# Patient Record
Sex: MALE | Race: WHITE | Employment: FULL TIME | URBAN - METROPOLITAN AREA
[De-identification: names, ages, dates, MRNs, and addresses within clinical notes are randomized per-mention and may not be internally consistent; named-entity substitution may affect disease eponyms.]

---

## 2020-06-02 ENCOUNTER — THERAPY VISIT (OUTPATIENT)
Dept: PHYSICAL THERAPY | Facility: CLINIC | Age: 60
End: 2020-06-02
Payer: COMMERCIAL

## 2020-06-02 DIAGNOSIS — M54.2 NECK PAIN: ICD-10-CM

## 2020-06-02 PROCEDURE — 97112 NEUROMUSCULAR REEDUCATION: CPT | Mod: GP | Performed by: PHYSICAL THERAPIST

## 2020-06-02 PROCEDURE — 97161 PT EVAL LOW COMPLEX 20 MIN: CPT | Mod: GP | Performed by: PHYSICAL THERAPIST

## 2020-06-02 PROCEDURE — 97110 THERAPEUTIC EXERCISES: CPT | Mod: GP | Performed by: PHYSICAL THERAPIST

## 2020-06-02 NOTE — LETTER
[facility] PHYSICAL THERAPY  [address]  [place] [address]  [phone]    2020    Re: Noe Diaz   :   1960  MRN:  [id number]   REFERRING PHYSICIAN:   Sim Humphrey    [facility] PHYSICAL THERAPY    Date of Initial Evaluation:  2020  Visits:  Rxs Used: 1  Reason for Referral:  Neck pain    EVALUATION SUMMARY    [facility] Initial Evaluation  Subjective:  The history is provided by the patient. No  was used.   Patient Health History  Noe Diaz being seen for Neck and bilateral arm pain.     Date of Onset:  with a flare-up 2020.   Problem occurred: unknown flare-up   Pain is reported as 2/10 (up to 7/10 with trying to sleep) on pain scale.  General health as reported by patient is good.  Pertinent medical history includes: high blood pressure.   Red flags:  None as reported by patient.  Medical allergies: none.   Surgeries include:  Orthopedic surgery. Other surgery history details: Lumbar L5 discectomy.    Current medications:  Anti-inflammatory.    Current occupation is Director .     Therapist Generated HPI Evaluation  Problem details: Has had chronic neck pain that started in  which was treated with medication and resolved.  Most recently over the past few months there has been an acute flare-up.  Has trailed muscle relaxants, medications and an injection 3 weeks ago with minimal pain.  There is bilateral arm pain L>R which can go into the left ring/pinky. .         Type of problem:  Cervical spine.    This is a new condition.  Condition occurred with:  Insidious onset.  Where condition occurred: at home.  Patient reports pain:  Lower cervical spine.  Pain is described as aching, sharp and shooting and is constant.  Pain radiates to:  Shoulder right, shoulder left and hand left. Pain is worse during the night.  Since onset symptoms are unchanged.  Associated symptoms:   Loss of strength. Symptoms are exacerbated by looking up or  Re: Noe Diaz   :   1960     down, lifting, sitting and lying down  and relieved by nothing.  Special tests included:  MRI and x-ray.    Restrictions due to condition include:  Working in normal job without restrictions.  Barriers include:  None as reported by patient.    Objective:  System  Shoulder Evaluation:  ROM:  Strength:    Flexion: Left:5/5   Pain:    Right: 5/5     Pain:   Elbow Flexion:  Left:5/5     Pain:    Right:5/5     Pain:  Elbow Extension:  Left:5/5     Pain:    Right:5/5     Pain:  ROM:  Strength:    Extension Wrist: Left: 5/5 Pain:  Right: 5/5 Pain:    Randee Cervical Evaluation  Movement Loss:  Flexion (Flex): nil  Retraction (RET): mod  Extension (EXT): mod  Lateral Flexion Right (LF R): min  Lateral Flexion Left (LF L): min  Rotation Right (ROT R): min  Rotation Left (ROT L): min                     Randee Thoracic Evalution:    Movement Loss:  Flexion (Flex):  Nil  Extension (EXT): mod  Rotation Lt (ROT L): min  Rotation Rt (ROT R): min    Assessment/Plan:    Patient is a 59 year old male with cervical complaints.    Patient has the following significant findings with corresponding treatment plan.                Diagnosis 1:  Neck pain  Pain -  hot/cold therapy, electric stimulation, manual therapy, self management, education and directional preference exercise  Decreased ROM/flexibility - manual therapy and therapeutic exercise  Decreased joint mobility - manual therapy and therapeutic exercise  Impaired muscle performance - neuro re-education  Decreased function - therapeutic activities    Previous and current functional limitations:  (See Goal Flow Sheet for this information)    Short term and Long term goals: (See Goal Flow Sheet for this information)     Communication ability:  Patient appears to be able to clearly communicate and understand verbal and written communication and follow directions  correctly.  Treatment Explanation - The following has been discussed with the patient:   RX  Re: Noe Diaz   :   1960     ordered/plan of care  Anticipated outcomes  Possible risks and side effects  This patient would benefit from PT intervention to resume normal activities.   Rehab potential is good.    Frequency:  1 X week, once daily  Duration:  for 6 weeks  Discharge Plan:  Achieve all LTG.  Independent in home treatment program.  Reach maximal therapeutic benefit.    Thank you for your referral.    INQUIRIES  Therapist: Sophia De Jesus DPT, Cert. MDT   [facility] PHYSICAL THERAPY  [address]  [facility] [address]  Phone: [phone]  Fax: [phone]

## 2020-06-02 NOTE — PROGRESS NOTES
[facility] Initial Evaluation  Subjective:  The history is provided by the patient. No  was used.   Patient Health History  Noe Diaz being seen for Neck and bilateral arm pain.     Date of Onset: 2014 with a flare-up 4/2020.   Problem occurred: unknown flare-up   Pain is reported as 2/10 (up to 7/10 with trying to sleep) on pain scale.  General health as reported by patient is good.  Pertinent medical history includes: high blood pressure.   Red flags:  None as reported by patient.  Medical allergies: none.   Surgeries include:  Orthopedic surgery. Other surgery history details: Lumbar L5 discectomy.    Current medications:  Anti-inflammatory.    Current occupation is Director .                     Therapist Generated HPI Evaluation  Problem details: Has had chronic neck pain that started in 2014 which was treated with medication and resolved.  Most recently over the past few months there has been an acute flare-up.  Has trailed muscle relaxants, medications and an injection 3 weeks ago with minimal pain.  There is bilateral arm pain L>R which can go into the left ring/pinky. .         Type of problem:  Cervical spine.    This is a new condition.  Condition occurred with:  Insidious onset.  Where condition occurred: at home.  Patient reports pain:  Lower cervical spine.  Pain is described as aching, sharp and shooting and is constant.  Pain radiates to:  Shoulder right, shoulder left and hand left. Pain is worse during the night.  Since onset symptoms are unchanged.  Associated symptoms:  Loss of strength. Symptoms are exacerbated by looking up or down, lifting, sitting and lying down  and relieved by nothing.  Special tests included:  MRI and x-ray.    Restrictions due to condition include:  Working in normal job without restrictions.  Barriers include:  None as reported by patient.                        Objective:  System                   Shoulder  Evaluation:  ROM:          Strength:    Flexion: Left:5/5   Pain:    Right: 5/5     Pain:                 Elbow Flexion:  Left:5/5     Pain:    Right:5/5     Pain:  Elbow Extension:  Left:5/5     Pain:    Right:5/5     Pain:             ROM:          Strength:          Extension Wrist: Left: 5/5 Pain:  Right: 5/5 Pain:                                                Randee Cervical Evaluation      Movement Loss:    Flexion (Flex): nil  Retraction (RET): mod  Extension (EXT): mod  Lateral Flexion Right (LF R): min  Lateral Flexion Left (LF L): min  Rotation Right (ROT R): min  Rotation Left (ROT L): min                               Randee Thoracic Evalution:    Movement Loss:  Flexion (Flex):  Nil  Extension (EXT): mod  Rotation Lt (ROT L): min  Rotation Rt (ROT R): min                            ROS    Assessment/Plan:    Patient is a 59 year old male with cervical complaints.    Patient has the following significant findings with corresponding treatment plan.                Diagnosis 1:  Neck pain  Pain -  hot/cold therapy, electric stimulation, manual therapy, self management, education and directional preference exercise  Decreased ROM/flexibility - manual therapy and therapeutic exercise  Decreased joint mobility - manual therapy and therapeutic exercise  Impaired muscle performance - neuro re-education  Decreased function - therapeutic activities        Previous and current functional limitations:  (See Goal Flow Sheet for this information)    Short term and Long term goals: (See Goal Flow Sheet for this information)     Communication ability:  Patient appears to be able to clearly communicate and understand verbal and written communication and follow directions correctly.  Treatment Explanation - The following has been discussed with the patient:   RX ordered/plan of care  Anticipated outcomes  Possible risks and side effects  This patient would benefit from PT intervention to resume normal activities.    Rehab potential is good.    Frequency:  1 X week, once daily  Duration:  for 6 weeks  Discharge Plan:  Achieve all LTG.  Independent in home treatment program.  Reach maximal therapeutic benefit.    Please refer to the daily flowsheet for treatment today, total treatment time and time spent performing 1:1 timed codes.

## 2020-06-12 ENCOUNTER — THERAPY VISIT (OUTPATIENT)
Dept: PHYSICAL THERAPY | Facility: CLINIC | Age: 60
End: 2020-06-12
Payer: COMMERCIAL

## 2020-06-12 DIAGNOSIS — M54.2 NECK PAIN: ICD-10-CM

## 2020-06-12 PROCEDURE — 97110 THERAPEUTIC EXERCISES: CPT | Mod: GP | Performed by: PHYSICAL THERAPIST

## 2020-06-12 PROCEDURE — 97140 MANUAL THERAPY 1/> REGIONS: CPT | Mod: GP | Performed by: PHYSICAL THERAPIST

## 2020-06-17 ENCOUNTER — TRANSFERRED RECORDS (OUTPATIENT)
Dept: HEALTH INFORMATION MANAGEMENT | Facility: CLINIC | Age: 60
End: 2020-06-17

## 2020-06-24 ENCOUNTER — THERAPY VISIT (OUTPATIENT)
Dept: PHYSICAL THERAPY | Facility: CLINIC | Age: 60
End: 2020-06-24
Payer: COMMERCIAL

## 2020-06-24 DIAGNOSIS — M54.2 NECK PAIN: ICD-10-CM

## 2020-06-24 PROCEDURE — 97110 THERAPEUTIC EXERCISES: CPT | Mod: GP | Performed by: PHYSICAL THERAPIST

## 2020-08-25 DIAGNOSIS — Z11.59 ENCOUNTER FOR SCREENING FOR OTHER VIRAL DISEASES: Primary | ICD-10-CM

## 2020-08-28 DIAGNOSIS — Z11.59 ENCOUNTER FOR SCREENING FOR OTHER VIRAL DISEASES: ICD-10-CM

## 2020-08-28 PROCEDURE — U0003 INFECTIOUS AGENT DETECTION BY NUCLEIC ACID (DNA OR RNA); SEVERE ACUTE RESPIRATORY SYNDROME CORONAVIRUS 2 (SARS-COV-2) (CORONAVIRUS DISEASE [COVID-19]), AMPLIFIED PROBE TECHNIQUE, MAKING USE OF HIGH THROUGHPUT TECHNOLOGIES AS DESCRIBED BY CMS-2020-01-R: HCPCS | Performed by: ORTHOPAEDIC SURGERY

## 2020-08-30 LAB
SARS-COV-2 RNA SPEC QL NAA+PROBE: NOT DETECTED
SPECIMEN SOURCE: NORMAL

## 2020-08-31 RX ORDER — ACETAMINOPHEN 500 MG
1500 TABLET ORAL 3 TIMES DAILY PRN
COMMUNITY
End: 2021-12-13

## 2020-08-31 RX ORDER — MULTIPLE VITAMINS W/ MINERALS TAB 9MG-400MCG
1 TAB ORAL EVERY MORNING
COMMUNITY

## 2020-08-31 RX ORDER — IBUPROFEN 200 MG
600 TABLET ORAL 3 TIMES DAILY PRN
COMMUNITY

## 2020-08-31 RX ORDER — LISINOPRIL 10 MG/1
10 TABLET ORAL EVERY MORNING
COMMUNITY

## 2020-08-31 RX ORDER — ASPIRIN 81 MG/1
81 TABLET ORAL EVERY MORNING
COMMUNITY

## 2020-08-31 RX ORDER — OMEGA-3S/DHA/EPA/FISH OIL/D3 300MG-1000
1 CAPSULE ORAL EVERY MORNING
COMMUNITY

## 2020-08-31 RX ORDER — SIMVASTATIN 40 MG
40 TABLET ORAL AT BEDTIME
COMMUNITY

## 2020-08-31 NOTE — PROGRESS NOTES
PTA medications updated by Medication Scribe prior to surgery via phone call with patient      -LAST DOSES ENTERED BY NURSE-    Comments:    Medication history sources: Patient, Surescripts and H&P  Medication history source reliability: Good  Adherence assessment: N/A Not Observed    Significant changes made to the medication list:  None      Additional medication history information:   None        Prior to Admission medications    Medication Sig Last Dose Taking? Auth Provider   acetaminophen (TYLENOL) 500 MG tablet Take 1,500 mg by mouth 3 times daily as needed for mild pain  at PRN Yes Reported, Patient   aspirin 81 MG EC tablet Take 81 mg by mouth every morning   at AM Yes Reported, Patient   ibuprofen (ADVIL/MOTRIN) 200 MG tablet Take 600 mg by mouth 3 times daily as needed for mild pain  at PRN Yes Reported, Patient   lisinopril (ZESTRIL) 10 MG tablet Take 10 mg by mouth every morning   at AM Yes Reported, Patient   multivitamin w/minerals (MULTI-VITAMIN) tablet Take 1 tablet by mouth every morning   at AM Yes Reported, Patient   Omega-3 Fatty Acids (FISH OIL BURP-LESS) 1200 MG CAPS Take 1 capsule by mouth every morning   at AM Yes Reported, Patient   simvastatin (ZOCOR) 40 MG tablet Take 40 mg by mouth At Bedtime  at HS Yes Reported, Patient   vitamin C-electrolytes (EMERGEN-C) 1000mg vitamin C super orange drink mix Take 1 packet by mouth every morning Mix 1 packet in 4-6oz water and drink.  at AM Yes Reported, Patient

## 2020-09-01 ENCOUNTER — APPOINTMENT (OUTPATIENT)
Dept: GENERAL RADIOLOGY | Facility: CLINIC | Age: 60
End: 2020-09-01
Attending: ORTHOPAEDIC SURGERY
Payer: COMMERCIAL

## 2020-09-01 ENCOUNTER — HOSPITAL ENCOUNTER (OUTPATIENT)
Facility: CLINIC | Age: 60
Discharge: HOME | End: 2020-09-02
Attending: ORTHOPAEDIC SURGERY | Admitting: ORTHOPAEDIC SURGERY
Payer: COMMERCIAL

## 2020-09-01 ENCOUNTER — ANESTHESIA EVENT (OUTPATIENT)
Dept: SURGERY | Facility: CLINIC | Age: 60
End: 2020-09-01
Payer: COMMERCIAL

## 2020-09-01 ENCOUNTER — ANESTHESIA (OUTPATIENT)
Dept: SURGERY | Facility: CLINIC | Age: 60
End: 2020-09-01
Payer: COMMERCIAL

## 2020-09-01 DIAGNOSIS — M48.02 STENOSIS, CERVICAL SPINE: Primary | ICD-10-CM

## 2020-09-01 LAB
ANION GAP SERPL CALCULATED.3IONS-SCNC: 5 MMOL/L (ref 3–14)
BUN SERPL-MCNC: 15 MG/DL (ref 7–30)
CALCIUM SERPL-MCNC: 8.6 MG/DL (ref 8.5–10.1)
CHLORIDE SERPL-SCNC: 106 MMOL/L (ref 94–109)
CO2 SERPL-SCNC: 26 MMOL/L (ref 20–32)
CREAT SERPL-MCNC: 0.8 MG/DL (ref 0.66–1.25)
GFR SERPL CREATININE-BSD FRML MDRD: >90 ML/MIN/{1.73_M2}
GLUCOSE SERPL-MCNC: 97 MG/DL (ref 70–99)
HGB BLD-MCNC: 14.8 G/DL (ref 13.3–17.7)
POTASSIUM SERPL-SCNC: 4.3 MMOL/L (ref 3.4–5.3)
SODIUM SERPL-SCNC: 137 MMOL/L (ref 133–144)

## 2020-09-01 PROCEDURE — 25000132 ZZH RX MED GY IP 250 OP 250 PS 637: Performed by: ORTHOPAEDIC SURGERY

## 2020-09-01 PROCEDURE — 85018 HEMOGLOBIN: CPT | Performed by: ANESTHESIOLOGY

## 2020-09-01 PROCEDURE — 25000128 H RX IP 250 OP 636: Performed by: INTERNAL MEDICINE

## 2020-09-01 PROCEDURE — 71000012 ZZH RECOVERY PHASE 1 LEVEL 1 FIRST HR: Performed by: ORTHOPAEDIC SURGERY

## 2020-09-01 PROCEDURE — 80048 BASIC METABOLIC PNL TOTAL CA: CPT | Performed by: ANESTHESIOLOGY

## 2020-09-01 PROCEDURE — 25800029 ZZH RX IP 258 OP 250: Performed by: ORTHOPAEDIC SURGERY

## 2020-09-01 PROCEDURE — 25000125 ZZHC RX 250: Performed by: ORTHOPAEDIC SURGERY

## 2020-09-01 PROCEDURE — 25000125 ZZHC RX 250: Performed by: NURSE ANESTHETIST, CERTIFIED REGISTERED

## 2020-09-01 PROCEDURE — 40000277 XR SURGERY CARM FLUORO LESS THAN 5 MIN W STILLS

## 2020-09-01 PROCEDURE — C1713 ANCHOR/SCREW BN/BN,TIS/BN: HCPCS | Performed by: ORTHOPAEDIC SURGERY

## 2020-09-01 PROCEDURE — 25000128 H RX IP 250 OP 636: Performed by: ORTHOPAEDIC SURGERY

## 2020-09-01 PROCEDURE — 36000071 ZZH SURGERY LEVEL 5 W FLUORO 1ST 30 MIN: Performed by: ORTHOPAEDIC SURGERY

## 2020-09-01 PROCEDURE — 25000128 H RX IP 250 OP 636: Performed by: NURSE ANESTHETIST, CERTIFIED REGISTERED

## 2020-09-01 PROCEDURE — 37000008 ZZH ANESTHESIA TECHNICAL FEE, 1ST 30 MIN: Performed by: ORTHOPAEDIC SURGERY

## 2020-09-01 PROCEDURE — 25000566 ZZH SEVOFLURANE, EA 15 MIN: Performed by: ORTHOPAEDIC SURGERY

## 2020-09-01 PROCEDURE — 37000009 ZZH ANESTHESIA TECHNICAL FEE, EACH ADDTL 15 MIN: Performed by: ORTHOPAEDIC SURGERY

## 2020-09-01 PROCEDURE — 27210794 ZZH OR GENERAL SUPPLY STERILE: Performed by: ORTHOPAEDIC SURGERY

## 2020-09-01 PROCEDURE — 25000128 H RX IP 250 OP 636: Performed by: ANESTHESIOLOGY

## 2020-09-01 PROCEDURE — 36000069 ZZH SURGERY LEVEL 5 EA 15 ADDTL MIN: Performed by: ORTHOPAEDIC SURGERY

## 2020-09-01 PROCEDURE — 36415 COLL VENOUS BLD VENIPUNCTURE: CPT | Performed by: ANESTHESIOLOGY

## 2020-09-01 PROCEDURE — 25800030 ZZH RX IP 258 OP 636: Performed by: NURSE ANESTHETIST, CERTIFIED REGISTERED

## 2020-09-01 PROCEDURE — 40000169 ZZH STATISTIC PRE-PROCEDURE ASSESSMENT I: Performed by: ORTHOPAEDIC SURGERY

## 2020-09-01 PROCEDURE — 71000013 ZZH RECOVERY PHASE 1 LEVEL 1 EA ADDTL HR: Performed by: ORTHOPAEDIC SURGERY

## 2020-09-01 DEVICE — IMPLANTABLE DEVICE: Type: IMPLANTABLE DEVICE | Site: SPINE CERVICAL | Status: FUNCTIONAL

## 2020-09-01 RX ORDER — PROPOFOL 10 MG/ML
INJECTION, EMULSION INTRAVENOUS PRN
Status: DISCONTINUED | OUTPATIENT
Start: 2020-09-01 | End: 2020-09-01

## 2020-09-01 RX ORDER — ONDANSETRON 2 MG/ML
INJECTION INTRAMUSCULAR; INTRAVENOUS PRN
Status: DISCONTINUED | OUTPATIENT
Start: 2020-09-01 | End: 2020-09-01

## 2020-09-01 RX ORDER — FENTANYL CITRATE 50 UG/ML
INJECTION, SOLUTION INTRAMUSCULAR; INTRAVENOUS PRN
Status: DISCONTINUED | OUTPATIENT
Start: 2020-09-01 | End: 2020-09-01

## 2020-09-01 RX ORDER — LIDOCAINE HYDROCHLORIDE 20 MG/ML
INJECTION, SOLUTION INFILTRATION; PERINEURAL PRN
Status: DISCONTINUED | OUTPATIENT
Start: 2020-09-01 | End: 2020-09-01

## 2020-09-01 RX ORDER — ACETAMINOPHEN 325 MG/1
975 TABLET ORAL EVERY 8 HOURS
Status: DISCONTINUED | OUTPATIENT
Start: 2020-09-01 | End: 2020-09-02 | Stop reason: HOSPADM

## 2020-09-01 RX ORDER — SODIUM CHLORIDE 450 MG/100ML
INJECTION, SOLUTION INTRAVENOUS CONTINUOUS
Status: DISCONTINUED | OUTPATIENT
Start: 2020-09-01 | End: 2020-09-02 | Stop reason: HOSPADM

## 2020-09-01 RX ORDER — SIMVASTATIN 40 MG
40 TABLET ORAL AT BEDTIME
Status: DISCONTINUED | OUTPATIENT
Start: 2020-09-01 | End: 2020-09-02 | Stop reason: HOSPADM

## 2020-09-01 RX ORDER — HYDROMORPHONE HYDROCHLORIDE 1 MG/ML
.3-.5 INJECTION, SOLUTION INTRAMUSCULAR; INTRAVENOUS; SUBCUTANEOUS
Status: DISCONTINUED | OUTPATIENT
Start: 2020-09-01 | End: 2020-09-02 | Stop reason: HOSPADM

## 2020-09-01 RX ORDER — OXYCODONE HYDROCHLORIDE 5 MG/1
5-10 TABLET ORAL
Status: DISCONTINUED | OUTPATIENT
Start: 2020-09-01 | End: 2020-09-02 | Stop reason: HOSPADM

## 2020-09-01 RX ORDER — METOCLOPRAMIDE 5 MG/1
10 TABLET ORAL EVERY 6 HOURS PRN
Status: DISCONTINUED | OUTPATIENT
Start: 2020-09-01 | End: 2020-09-02 | Stop reason: HOSPADM

## 2020-09-01 RX ORDER — ONDANSETRON 2 MG/ML
4 INJECTION INTRAMUSCULAR; INTRAVENOUS EVERY 30 MIN PRN
Status: DISCONTINUED | OUTPATIENT
Start: 2020-09-01 | End: 2020-09-01 | Stop reason: HOSPADM

## 2020-09-01 RX ORDER — DEXAMETHASONE SODIUM PHOSPHATE 4 MG/ML
INJECTION, SOLUTION INTRA-ARTICULAR; INTRALESIONAL; INTRAMUSCULAR; INTRAVENOUS; SOFT TISSUE PRN
Status: DISCONTINUED | OUTPATIENT
Start: 2020-09-01 | End: 2020-09-01

## 2020-09-01 RX ORDER — GLYCOPYRROLATE 0.2 MG/ML
INJECTION, SOLUTION INTRAMUSCULAR; INTRAVENOUS PRN
Status: DISCONTINUED | OUTPATIENT
Start: 2020-09-01 | End: 2020-09-01

## 2020-09-01 RX ORDER — ONDANSETRON 2 MG/ML
4 INJECTION INTRAMUSCULAR; INTRAVENOUS EVERY 6 HOURS PRN
Status: DISCONTINUED | OUTPATIENT
Start: 2020-09-01 | End: 2020-09-02 | Stop reason: HOSPADM

## 2020-09-01 RX ORDER — NALOXONE HYDROCHLORIDE 0.4 MG/ML
.1-.4 INJECTION, SOLUTION INTRAMUSCULAR; INTRAVENOUS; SUBCUTANEOUS
Status: DISCONTINUED | OUTPATIENT
Start: 2020-09-01 | End: 2020-09-02 | Stop reason: HOSPADM

## 2020-09-01 RX ORDER — ACETAMINOPHEN 325 MG/1
650 TABLET ORAL EVERY 4 HOURS PRN
Status: DISCONTINUED | OUTPATIENT
Start: 2020-09-04 | End: 2020-09-02 | Stop reason: HOSPADM

## 2020-09-01 RX ORDER — LIDOCAINE 40 MG/G
CREAM TOPICAL
Status: DISCONTINUED | OUTPATIENT
Start: 2020-09-01 | End: 2020-09-02 | Stop reason: HOSPADM

## 2020-09-01 RX ORDER — LISINOPRIL 10 MG/1
10 TABLET ORAL EVERY MORNING
Status: DISCONTINUED | OUTPATIENT
Start: 2020-09-01 | End: 2020-09-02 | Stop reason: HOSPADM

## 2020-09-01 RX ORDER — ASPIRIN 81 MG/1
81 TABLET ORAL EVERY MORNING
Status: DISCONTINUED | OUTPATIENT
Start: 2020-09-02 | End: 2020-09-02 | Stop reason: HOSPADM

## 2020-09-01 RX ORDER — HYDROMORPHONE HYDROCHLORIDE 1 MG/ML
.3-.5 INJECTION, SOLUTION INTRAMUSCULAR; INTRAVENOUS; SUBCUTANEOUS EVERY 5 MIN PRN
Status: DISCONTINUED | OUTPATIENT
Start: 2020-09-01 | End: 2020-09-01 | Stop reason: HOSPADM

## 2020-09-01 RX ORDER — SODIUM CHLORIDE, SODIUM LACTATE, POTASSIUM CHLORIDE, CALCIUM CHLORIDE 600; 310; 30; 20 MG/100ML; MG/100ML; MG/100ML; MG/100ML
INJECTION, SOLUTION INTRAVENOUS CONTINUOUS PRN
Status: DISCONTINUED | OUTPATIENT
Start: 2020-09-01 | End: 2020-09-01

## 2020-09-01 RX ORDER — CEFAZOLIN SODIUM IN 0.9 % NACL 3 G/100 ML
3 INTRAVENOUS SOLUTION, PIGGYBACK (ML) INTRAVENOUS
Status: COMPLETED | OUTPATIENT
Start: 2020-09-01 | End: 2020-09-01

## 2020-09-01 RX ORDER — CEFAZOLIN SODIUM 2 G/100ML
2 INJECTION, SOLUTION INTRAVENOUS
Status: DISCONTINUED | OUTPATIENT
Start: 2020-09-01 | End: 2020-09-01

## 2020-09-01 RX ORDER — SODIUM CHLORIDE, SODIUM LACTATE, POTASSIUM CHLORIDE, CALCIUM CHLORIDE 600; 310; 30; 20 MG/100ML; MG/100ML; MG/100ML; MG/100ML
INJECTION, SOLUTION INTRAVENOUS CONTINUOUS
Status: DISCONTINUED | OUTPATIENT
Start: 2020-09-01 | End: 2020-09-01 | Stop reason: HOSPADM

## 2020-09-01 RX ORDER — CEFAZOLIN SODIUM 1 G/3ML
1 INJECTION, POWDER, FOR SOLUTION INTRAMUSCULAR; INTRAVENOUS SEE ADMIN INSTRUCTIONS
Status: DISCONTINUED | OUTPATIENT
Start: 2020-09-01 | End: 2020-09-01 | Stop reason: HOSPADM

## 2020-09-01 RX ORDER — NALOXONE HYDROCHLORIDE 0.4 MG/ML
.1-.4 INJECTION, SOLUTION INTRAMUSCULAR; INTRAVENOUS; SUBCUTANEOUS
Status: DISCONTINUED | OUTPATIENT
Start: 2020-09-01 | End: 2020-09-01

## 2020-09-01 RX ORDER — ONDANSETRON 4 MG/1
4 TABLET, ORALLY DISINTEGRATING ORAL EVERY 6 HOURS PRN
Status: DISCONTINUED | OUTPATIENT
Start: 2020-09-01 | End: 2020-09-02 | Stop reason: HOSPADM

## 2020-09-01 RX ORDER — EPHEDRINE SULFATE 50 MG/ML
INJECTION, SOLUTION INTRAMUSCULAR; INTRAVENOUS; SUBCUTANEOUS PRN
Status: DISCONTINUED | OUTPATIENT
Start: 2020-09-01 | End: 2020-09-01

## 2020-09-01 RX ORDER — METOCLOPRAMIDE HYDROCHLORIDE 5 MG/ML
10 INJECTION INTRAMUSCULAR; INTRAVENOUS EVERY 6 HOURS PRN
Status: DISCONTINUED | OUTPATIENT
Start: 2020-09-01 | End: 2020-09-02 | Stop reason: HOSPADM

## 2020-09-01 RX ORDER — NEOSTIGMINE METHYLSULFATE 1 MG/ML
VIAL (ML) INJECTION PRN
Status: DISCONTINUED | OUTPATIENT
Start: 2020-09-01 | End: 2020-09-01

## 2020-09-01 RX ORDER — ONDANSETRON 4 MG/1
4 TABLET, ORALLY DISINTEGRATING ORAL EVERY 30 MIN PRN
Status: DISCONTINUED | OUTPATIENT
Start: 2020-09-01 | End: 2020-09-01 | Stop reason: HOSPADM

## 2020-09-01 RX ORDER — CEFAZOLIN SODIUM 2 G/100ML
2 INJECTION, SOLUTION INTRAVENOUS EVERY 8 HOURS
Status: COMPLETED | OUTPATIENT
Start: 2020-09-01 | End: 2020-09-02

## 2020-09-01 RX ORDER — CEFAZOLIN SODIUM IN 0.9 % NACL 3 G/100 ML
3 INTRAVENOUS SOLUTION, PIGGYBACK (ML) INTRAVENOUS
Status: DISCONTINUED | OUTPATIENT
Start: 2020-09-01 | End: 2020-09-01 | Stop reason: HOSPADM

## 2020-09-01 RX ORDER — FENTANYL CITRATE 50 UG/ML
25-50 INJECTION, SOLUTION INTRAMUSCULAR; INTRAVENOUS
Status: DISCONTINUED | OUTPATIENT
Start: 2020-09-01 | End: 2020-09-01 | Stop reason: HOSPADM

## 2020-09-01 RX ADMIN — ACETAMINOPHEN 975 MG: 325 TABLET, FILM COATED ORAL at 14:10

## 2020-09-01 RX ADMIN — ROCURONIUM BROMIDE 20 MG: 10 INJECTION INTRAVENOUS at 10:45

## 2020-09-01 RX ADMIN — PROPOFOL 200 MG: 10 INJECTION, EMULSION INTRAVENOUS at 10:11

## 2020-09-01 RX ADMIN — LIDOCAINE HYDROCHLORIDE 100 MG: 20 INJECTION, SOLUTION INFILTRATION; PERINEURAL at 10:11

## 2020-09-01 RX ADMIN — SODIUM CHLORIDE, POTASSIUM CHLORIDE, SODIUM LACTATE AND CALCIUM CHLORIDE: 600; 310; 30; 20 INJECTION, SOLUTION INTRAVENOUS at 11:08

## 2020-09-01 RX ADMIN — CEFAZOLIN SODIUM 2 G: 2 INJECTION, SOLUTION INTRAVENOUS at 18:56

## 2020-09-01 RX ADMIN — Medication 3 G: at 10:28

## 2020-09-01 RX ADMIN — SODIUM CHLORIDE: 4.5 INJECTION, SOLUTION INTRAVENOUS at 13:50

## 2020-09-01 RX ADMIN — ACETAMINOPHEN 975 MG: 325 TABLET, FILM COATED ORAL at 22:08

## 2020-09-01 RX ADMIN — ONDANSETRON 4 MG: 2 INJECTION INTRAMUSCULAR; INTRAVENOUS at 10:28

## 2020-09-01 RX ADMIN — LISINOPRIL 10 MG: 10 TABLET ORAL at 16:14

## 2020-09-01 RX ADMIN — Medication 30 MG: at 10:25

## 2020-09-01 RX ADMIN — HYDROMORPHONE HYDROCHLORIDE 0.3 MG: 1 INJECTION, SOLUTION INTRAMUSCULAR; INTRAVENOUS; SUBCUTANEOUS at 14:10

## 2020-09-01 RX ADMIN — MIDAZOLAM 2 MG: 1 INJECTION INTRAMUSCULAR; INTRAVENOUS at 10:08

## 2020-09-01 RX ADMIN — SIMVASTATIN 40 MG: 40 TABLET, FILM COATED ORAL at 22:08

## 2020-09-01 RX ADMIN — GLYCOPYRROLATE 0.2 MG: 0.2 INJECTION, SOLUTION INTRAMUSCULAR; INTRAVENOUS at 10:36

## 2020-09-01 RX ADMIN — ROCURONIUM BROMIDE 50 MG: 10 INJECTION INTRAVENOUS at 10:11

## 2020-09-01 RX ADMIN — FENTANYL CITRATE 100 MCG: 50 INJECTION, SOLUTION INTRAMUSCULAR; INTRAVENOUS at 10:11

## 2020-09-01 RX ADMIN — ROCURONIUM BROMIDE 20 MG: 10 INJECTION INTRAVENOUS at 11:11

## 2020-09-01 RX ADMIN — HYDROMORPHONE HYDROCHLORIDE 0.5 MG: 1 INJECTION, SOLUTION INTRAMUSCULAR; INTRAVENOUS; SUBCUTANEOUS at 10:36

## 2020-09-01 RX ADMIN — DEXMEDETOMIDINE HYDROCHLORIDE 0.3 MCG/KG/HR: 100 INJECTION, SOLUTION INTRAVENOUS at 10:15

## 2020-09-01 RX ADMIN — NEOSTIGMINE METHYLSULFATE 5 MG: 1 INJECTION, SOLUTION INTRAVENOUS at 11:36

## 2020-09-01 RX ADMIN — GLYCOPYRROLATE 1 MG: 0.2 INJECTION, SOLUTION INTRAMUSCULAR; INTRAVENOUS at 11:36

## 2020-09-01 RX ADMIN — DEXAMETHASONE SODIUM PHOSPHATE 4 MG: 4 INJECTION, SOLUTION INTRA-ARTICULAR; INTRALESIONAL; INTRAMUSCULAR; INTRAVENOUS; SOFT TISSUE at 10:28

## 2020-09-01 RX ADMIN — SODIUM CHLORIDE, POTASSIUM CHLORIDE, SODIUM LACTATE AND CALCIUM CHLORIDE: 600; 310; 30; 20 INJECTION, SOLUTION INTRAVENOUS at 10:08

## 2020-09-01 RX ADMIN — FENTANYL CITRATE 50 MCG: 0.05 INJECTION, SOLUTION INTRAMUSCULAR; INTRAVENOUS at 12:28

## 2020-09-01 RX ADMIN — FENTANYL CITRATE 50 MCG: 0.05 INJECTION, SOLUTION INTRAMUSCULAR; INTRAVENOUS at 12:14

## 2020-09-01 RX ADMIN — GLYCOPYRROLATE 0.2 MG: 0.2 INJECTION, SOLUTION INTRAMUSCULAR; INTRAVENOUS at 10:23

## 2020-09-01 RX ADMIN — HYDROMORPHONE HYDROCHLORIDE 0.5 MG: 1 INJECTION, SOLUTION INTRAMUSCULAR; INTRAVENOUS; SUBCUTANEOUS at 12:58

## 2020-09-01 RX ADMIN — Medication 5 MG: at 11:09

## 2020-09-01 RX ADMIN — Medication 5 MG: at 11:30

## 2020-09-01 SDOH — HEALTH STABILITY: MENTAL HEALTH: CURRENT SMOKER: 0

## 2020-09-01 ASSESSMENT — MIFFLIN-ST. JEOR: SCORE: 2214.68

## 2020-09-01 ASSESSMENT — ENCOUNTER SYMPTOMS: SEIZURES: 0

## 2020-09-01 ASSESSMENT — LIFESTYLE VARIABLES: TOBACCO_USE: 0

## 2020-09-01 ASSESSMENT — COPD QUESTIONNAIRES: COPD: 0

## 2020-09-01 NOTE — BRIEF OP NOTE
[facility]    Brief Operative Note    Pre-operative diagnosis: Cervical spinal stenosis [M48.02]  Post-operative diagnosis Same as pre-operative diagnosis    Procedure: Procedure(s):  ANTERIOR CERVICAL DECOMPRESSION AND FUSION CERVICAL 6-CERVICAL 7  Surgeon: Surgeon(s) and Role:     * John Hernandez MD - Primary     * Sanaz Logan PA-C - Assisting  Anesthesia: General   Estimated blood loss: Less than 50 ml  Drains: None  Specimens: * No specimens in log *  Findings:   None.  Complications: None.  Implants:   Implant Name Type Inv. Item Serial No.  Lot No. LRB No. Used Action   ZERO-P VA IMPLANT 6MM HEIGHT LORDOTIC STERILE     SYNTHES [id number] N/A 1 Implanted   3.7MM TI CERVICAL SPINE SCR SLF-DRLG/VARIABLE ANGLE 14MM Metallic Hardware/[place] 3.7MM TI CERVICAL SPINE SCR SLF-DRLG/VARIABLE ANGLE 14MM  SYNTHES-STRATEC [id number] N/A 1 Implanted   3.7MM TI CERVICAL SPINE SCR SLF-DRLG/VARIABLE ANGLE 14MM Metallic Hardware/[place] 3.7MM TI CERVICAL SPINE SCR SLF-DRLG/VARIABLE ANGLE 14MM  SYNTHES-STRATEC  N/A 2 Wasted   16MM SD SCREW     41 [id number] N/A 1 Implanted   16MM SD SCREW     [id number] N/A 1 Wasted

## 2020-09-01 NOTE — ANESTHESIA CARE TRANSFER NOTE
Patient: Noe Diaz    Procedure(s):  ANTERIOR CERVICAL DECOMPRESSION AND FUSION CERVICAL 6-CERVICAL 7    Diagnosis: Cervical spinal stenosis [M48.02]  Diagnosis Additional Information: No value filed.    Anesthesia Type:   General     Note:  Airway :Face Mask  Patient transferred to:PACU  Comments: Transferred to PACU, spontaneous respirations, 10L oxygen via facemask.  All monitors and alarms on and functioning, VSS.  Patient awake, comfortable.  Report to PACU RN.Handoff Report: Identifed the Patient, Identified the Reponsible Provider, Reviewed the pertinent medical history, Discussed the surgical course, Reviewed Intra-OP anesthesia mangement and issues during anesthesia, Set expectations for post-procedure period and Allowed opportunity for questions and acknowledgement of understanding      Vitals: (Last set prior to Anesthesia Care Transfer)    CRNA VITALS  9/1/2020 1123 - 9/1/2020 1158      9/1/2020             NIBP:  (!) 184/105    NIBP Mean:  129                Electronically Signed By: PIETER Rubin CRNA  September 1, 2020  11:58 AM

## 2020-09-01 NOTE — PROGRESS NOTES
Capno constantly alarming. Probe changed multiple times without any change. Placed on pulse oximeter.

## 2020-09-01 NOTE — OR NURSING
Patient bladder scanned for 175cc. Denies nausea, tolerating ice chips well. Pain tolerable at a 4 on 1-10 scale. LOPEZ strong and equal, mumbness/tingling on left arm. Report given to RN on station 77. Wife updated. trnsferred with one bag of personal belongings and also a brown suitcase

## 2020-09-01 NOTE — ANESTHESIA PREPROCEDURE EVALUATION
Anesthesia Pre-Procedure Evaluation    Patient: Noe Diaz   MRN: [id number] : 1960          Preoperative Diagnosis: Cervical spinal stenosis [M48.02]    Procedure(s):  ANTERIOR CERVICAL DECOMPRESSION AND FUSION CERVICAL 6-CERVICAL 7    Past Medical History:   Diagnosis Date     Abnormal glucose      Acute sinusitis      Benign essential hypertension      Cervical radiculitis      Chest pain, atypical      Decreased sex drive      Dysuria      Elevated liver enzymes      Esophageal reflux      Generalized anxiety disorder      Hematoma, subungual, thumb, left      Insomnia      Left-sided chest pain      Low testosterone in male      Obesity      Pure hypercholesterolemia      Rhinitis, chronic      Splenomegaly      Thumb injury, left, initial encounter      Past Surgical History:   Procedure Laterality Date     HERNIA REPAIR       ORTHOPEDIC SURGERY      lumber discectomy       Anesthesia Evaluation     .             ROS/MED HX    ENT/Pulmonary:     (+)BUBBA risk factors hypertension, , . .   (-) tobacco use, asthma, COPD and sleep apnea   Neurologic: Comment: Cervical radiculopathy     (-) seizures and CVA   Cardiovascular:     (+) Dyslipidemia, hypertension----. : . . . :. .       METS/Exercise Tolerance:  >4 METS   Hematologic:         Musculoskeletal:         GI/Hepatic:     (+) GERD Asymptomatic on medication,       Renal/Genitourinary:         Endo:     (+) Obesity, .   (-) Type I DM and Type II DM   Psychiatric:     (+) psychiatric history anxiety      Infectious Disease:         Malignancy:         Other:                          Physical Exam  Normal systems: dental    Airway   Mallampati: III  TM distance: >3 FB  Neck ROM: limited    Dental     Cardiovascular   Rhythm and rate: regular      Pulmonary    breath sounds clear to auscultation            Lab Results   Component Value Date    HGB 14.3 10/19/2005       Preop Vitals  BP Readings from Last 3 Encounters:   20 (!) 162/85    Pulse  "Readings from Last 3 Encounters:   No data found for Pulse      Resp Readings from Last 3 Encounters:   09/01/20 20    SpO2 Readings from Last 3 Encounters:   09/01/20 99%      Temp Readings from Last 1 Encounters:   09/01/20 36.6  C (97.8  F) (Temporal)    Ht Readings from Last 1 Encounters:   09/01/20 1.956 m (6' 5\")      Wt Readings from Last 1 Encounters:   09/01/20 129.2 kg (284 lb 14.4 oz)    Estimated body mass index is 33.78 kg/m  as calculated from the following:    Height as of this encounter: 1.956 m (6' 5\").    Weight as of this encounter: 129.2 kg (284 lb 14.4 oz).       Anesthesia Plan      History & Physical Review  History and physical reviewed and following examination; no interval change.    ASA Status:  2 .    NPO Status:  > 8 hours    Plan for General with Intravenous induction. Maintenance will be Balanced.    PONV prophylaxis:  Ondansetron (or other 5HT-3) and Dexamethasone or Solumedrol  Additional equipment: Videolaryngoscope Precedex gtt  Ketamine 30 mg prior to incision.    The patient is not a current smoker      Postoperative Care  Postoperative pain management:  Multi-modal analgesia.      Consents  Anesthetic plan, risks, benefits and alternatives discussed with:  Patient.  Use of blood products discussed: Yes.   Use of blood products discussed with Patient.  Consented to blood products.  .                 Frantz Muñiz MD  "

## 2020-09-01 NOTE — OP NOTE
Procedure Date: 09/01/2020      PREOPERATIVE DIAGNOSIS:  Cervical stenosis with cervical radiculopathy, C6-C7.      POSTOPERATIVE DIAGNOSIS:  Cervical stenosis with cervical radiculopathy, C6-C7.      PROCEDURES:   1.  Anterior cervical decompression, C6-C7.   2.  Anterior cervical fusion with Zero-P interbody device and plating with local autograft.      SURGEON:  John Hernandez MD      ASSISTANT:  VERNA Wei      DESCRIPTION OF SURGERY:  The patient was placed under general anesthesia.  After assurance of adequate anesthetic levels, positioned supine on the operating room table.  His anterior neck was prepped and draped in the usual fashion.  He did receive 3 grams of cefazolin, and this was circulating at the time of incision.  An anterior lateral incision was made on the left side and carried down through the platysma.  The interval between the sternocleidomastoid and carotid sheath laterally with the esophagus and trachea medially was developed and carried down to the prevertebral fascia.  This was incised.  The C6-C7 disc space was identified and a needle placed within the disc space and image intensification utilized to confirm anatomic placement.  At this time, the microscope was brought onto the field.  The longus colli were elevated and self-retaining retractors placed.  Ruskin distraction pins were now placed in the C6 and C7.  The anterior annulus was excised.  This was found to be partially calcified.  The disc itself was found to be markedly collapsed.  The disc material was quite degenerated.  The disc material was removed with curets.  Distraction was placed across the disc space, and the dorsal osteophytes were thinned with a Midas Israel with this bone being retained.  Decompression was now carried out through the posterior longitudinal ligament, resulting in excellent decompression of the thecal sac, as well as the neural foramen.  The interspace was found to accept a 6 mm implant.  The  medullary cavity of the PEEK cage was packed with the reamings from the endplates of C6 and C7.  This was placed in position and then the distraction removed.  A 14 mm screw was placed into C7 with a 16 mm screw into C6 with good purchase.  The wound was irrigated copiously.  Final radiographs demonstrated appropriate placement of the hardware.  The platysma was now closed with 3-0 Vicryl and the skin with a running subcuticular 4-0 undyed Vicryl.  Steri-Strips followed by a sterile compressive dressing and a soft cervical collar were applied.  He was then extubated, awakened, and taken to the recovery room in stable condition.        The blood loss was approximately 25 mL.  The specimen was the C6-C7 disc.  There were no intraoperative complications.         SOBIA GARRETT MD             D: 2020   T: 2020   MT: SYDNIE      Name:     DALIA NUNEZ   MRN:      [id number]        Account:        [id number]   :      1960           Procedure Date: 2020      Document: [id number]

## 2020-09-01 NOTE — PLAN OF CARE
A&Ox4. VS htn in 150s-160s SBP. HR chris in the 50bpm on 1L O2. Due to void. Due to dangle. Soft neck brace intact, incision CDI. Neuros intact. L arm numbness/tingling per baseline. Pain with dilaudid and tylenol. Tolerating ice chips/water.

## 2020-09-01 NOTE — ANESTHESIA POSTPROCEDURE EVALUATION
Patient: Noe Diaz    Procedure(s):  ANTERIOR CERVICAL DECOMPRESSION AND FUSION CERVICAL 6-CERVICAL 7    Diagnosis:Cervical spinal stenosis [M48.02]  Diagnosis Additional Information: No value filed.    Anesthesia Type:  General    Note:  Anesthesia Post Evaluation    Patient location during evaluation: PACU  Patient participation: Able to fully participate in evaluation  Level of consciousness: awake and alert  Pain management: adequate  Airway patency: patent  Cardiovascular status: acceptable and stable  Respiratory status: acceptable, spontaneous ventilation, unassisted and nonlabored ventilation  Hydration status: acceptable  PONV: none             Last vitals:  Vitals:    09/01/20 1315 09/01/20 1342 09/01/20 1400   BP: (!) 150/80 (!) 169/89 (!) 167/84   Pulse: 53 56    Resp: 14 15    Temp: 36.7  C (98.1  F)     SpO2: 97% 92% 95%         Electronically Signed By: Frantz Muñiz MD  September 1, 2020  2:06 PM

## 2020-09-02 VITALS
BODY MASS INDEX: 33.64 KG/M2 | RESPIRATION RATE: 16 BRPM | DIASTOLIC BLOOD PRESSURE: 76 MMHG | HEIGHT: 77 IN | SYSTOLIC BLOOD PRESSURE: 141 MMHG | HEART RATE: 58 BPM | WEIGHT: 284.9 LBS | TEMPERATURE: 97.7 F | OXYGEN SATURATION: 96 %

## 2020-09-02 PROCEDURE — 25000132 ZZH RX MED GY IP 250 OP 250 PS 637: Performed by: ORTHOPAEDIC SURGERY

## 2020-09-02 PROCEDURE — 25000128 H RX IP 250 OP 636: Performed by: ORTHOPAEDIC SURGERY

## 2020-09-02 RX ORDER — CALCIUM CARBONATE 500 MG/1
1000 TABLET, CHEWABLE ORAL EVERY 4 HOURS PRN
Status: DISCONTINUED | OUTPATIENT
Start: 2020-09-02 | End: 2020-09-02 | Stop reason: HOSPADM

## 2020-09-02 RX ORDER — TRAMADOL HYDROCHLORIDE 50 MG/1
50 TABLET ORAL EVERY 6 HOURS PRN
Status: DISCONTINUED | OUTPATIENT
Start: 2020-09-02 | End: 2020-09-02 | Stop reason: HOSPADM

## 2020-09-02 RX ORDER — TRAMADOL HYDROCHLORIDE 50 MG/1
50 TABLET ORAL EVERY 6 HOURS PRN
Qty: 20 TABLET | Refills: 0 | Status: SHIPPED | OUTPATIENT
Start: 2020-09-02 | End: 2021-12-13

## 2020-09-02 RX ADMIN — CALCIUM CARBONATE (ANTACID) CHEW TAB 500 MG 1000 MG: 500 CHEW TAB at 11:31

## 2020-09-02 RX ADMIN — ACETAMINOPHEN 975 MG: 325 TABLET, FILM COATED ORAL at 11:32

## 2020-09-02 RX ADMIN — CALCIUM CARBONATE (ANTACID) CHEW TAB 500 MG 1000 MG: 500 CHEW TAB at 03:35

## 2020-09-02 RX ADMIN — CEFAZOLIN SODIUM 2 G: 2 INJECTION, SOLUTION INTRAVENOUS at 03:35

## 2020-09-02 RX ADMIN — CALCIUM CARBONATE (ANTACID) CHEW TAB 500 MG 1000 MG: 500 CHEW TAB at 15:10

## 2020-09-02 RX ADMIN — LISINOPRIL 10 MG: 10 TABLET ORAL at 08:57

## 2020-09-02 RX ADMIN — ACETAMINOPHEN 975 MG: 325 TABLET, FILM COATED ORAL at 05:34

## 2020-09-02 RX ADMIN — ASPIRIN 81 MG: 81 TABLET, COATED ORAL at 08:57

## 2020-09-02 NOTE — PLAN OF CARE
Anterior cervical fusion/decompression POD1. A&O. CMS/neuros - alert & oriented, following all commands, no vision deficits, no dizziness, no shortness of breath/discomfort in bilateral shoulders (left greater than right). Tolerating soft foods/regular diet. VSS. Dressing - anterior changed prior to discharge/sterry strips intact/no drainage. Up independently in room & hallway. Continent of urine/no bm since surgery/flatus positive. C/o neck & incisional discomfort - tylenol effective. C/o of some indigestion intermittently - tums provided & helpful. Pt scoring green on the Aggression Stop Light Tool. Discharge planned for this afternoon, awaiting ride from spouse to home.  AVS reviewed, prescription for tramadol provided/questions answered.  Patient agrees with discharge plan, no unmet needs at this time.

## 2020-09-02 NOTE — DISCHARGE INSTRUCTIONS
TAKE HOME INSTRUCTIONS FOLLOWING AN ANTERIOR   CERVICAL FUSION    John Hernandez M.D.   [facility]  [phone]    The following instructions are general guidelines which apply if you have had an anterior cervical (neck) fusion. If you have further questions after reviewing these instructions, please feel free to contact Kim Johnson R.N. at [phone].    ACTIVITY:    Walking plays a very important role in your recovery. We encourage you to begin taking short walks as soon as you are discharged from the hospital, increasing your distance and time each day.  Two to three weeks after surgery you may want to vary walking with other forms of aerobic activity, such as riding a stationary bike, using a Nordic Track (without the arm motion) or Stairmaster machine. One of these forms of aerobic activity may be more comfortable than another. You have to experiment and see which one works best for you. What is most important is that you begin SOME form of aerobic exercise.  Your goal for 6 weeks after surgery should be a minimum of 30 minutes, 3 times a week. There are many benefits to aerobic exercise including, muscle strengthening, weight loss, mood elevation and improved sleep.    During the first 2 months after surgery we DO NOT want you to begin any stretching or strengthening exercises for your neck. You may have already been through a course of physical therapy before surgery where you were taught exercises for your neck. You may gradually resume these 2 months post-operatively. If you would like a refresher course or did not participate in PT before surgery and would like to at this time, please ask for a prescription from Dr. Hernandez.     SOFT COLLAR:    You may remove your soft collar during meal times, for showering and at night. Most likely you will be able to discontinue wearing the soft collar after your first post-operative appointment.    INCISION/DRESSING:    You have dissolvable  sutures beneath the skin, which do not need to be removed. The tape-like strips across your incision are called steri-strips. They will eventually fall off on their own and if they have not done so by the time you come in for your first post- operative appointment, we will remove them for you. For your own comfort you may wish to keep a dressing over the incision for a few days. Please inspect your incision once a day and notify our office if there is persistent drainage, swelling and/or redness.       PAIN/PAIN MEDICATIONS:    Pain medication will not take away 100% of your pain. It is meant to keep you reasonably comfortable. If you were taking a lot of pain medications before surgery the medications may not be as effective after surgery. It is normal to experience some pain as you increase your activity. Your pain medication will be refilled up to 4-6 weeks after surgery. Over the course of that period you should gradually decrease the amount of pain medications you take each day. As you decrease your pain medication you should continue taking it at night so that you can still get a good night s sleep. After the pain medication is discontinued, Tylenol should be enough to keep you comfortable.     DO NOT take any form of anti-inflammatory medications such as Advil, Motrin, ibuprofen, Aleve, aspirin, or Celebrex for 3 months after surgery. Studies have shown that taking anti-inflammatory medications after a fusion slows down the healing process.     BOWELS/CONSTIPATION:    Narcotic pain medication can be very constipating. Make sure you drink plenty of fluids and eat plenty of fresh fruits and vegetables to decrease constipation. Walking will also promote normal bowel function. If the constipation continues, you may want to purchase Metamucil or an over the counter suppository at your pharmacy.     WORK:    Please consult with Dr. Hernandez as to when you may return to work. This will vary depending on the type of  work you perform.    GENERAL DO S AND DON TS:      You may shower once you are discharged from the hospital. You do not need to cover your incision. Remove your soft collar and dressing prior to showering. Avoid bending your neck forwards and backwards when you are in the shower.      You may soak in a tub or Jacuzzi after your first post-operative appointment if your incision is healed and not draining.      Avoid bending your neck forwards and backwards repetitively and lifting more than 10 pounds for 2 months after surgery.      We suggest that you not drive for two to three weeks after surgery. You may drive once you have stopped your narcotic pain medications and as comfort allows.      You may resume sexual activity as comfort allows.       If you develop a fever greater than 100.6 (F) which lasts more than 2 days, please contact our office.       After you are discharged from the hospital, please contact our office to schedule your first follow-up appointment for 10-14 days after surgery. At that first appointment, we will take x-rays of your neck and answer any questions you may have.       Remember, this surgery does not give you a normal neck. You may experience periodic flare-ups of neck pain.

## 2020-09-02 NOTE — PLAN OF CARE
Pt here with cervical fusion and decompression. Neuros intact. Baseline N/T in hands and feet. Up SBA with belt to BR. Unable to void, straight cath at 1900 for 800 ml. Tolerating regular diet. Dressing CDI. Cervical collar on. IVF infusing. Slight HTN but improved with PTA med.

## 2020-09-02 NOTE — PLAN OF CARE
AxO x4. VSS, RA. Pain managed with scheduled Tylenol. CMS intact. Ex baseline n/t in hands, unchanged. Neuro's intact. Neck brace in place. Regular diet. SBA. Void x2 this shift. Discharge pending progress.

## 2020-09-02 NOTE — DISCHARGE SUMMARY
Admit Date:     2020   Discharge Date:           DISCHARGE DIAGNOSIS:  Cervical radiculopathy with cervical stenosis at C6 to C7.      PROCEDURE:  Anterior cervical decompression, C6 to C7, with anterior cervical fusion utilizing Zero-P interbody device and plating with local autograft.      HISTORY OF PRESENT ILLNESS AND HOSPITAL COURSE:  Dalia Diaz is 59-year-old gentleman who has developed significant neck and upper extremity radicular pain.  Studies have demonstrated stenosis at the C6 to C7 level.  After failing nonoperative management, it was elected he undergo the aforementioned procedure.  Intraoperatively, this proceeded well.  Postoperatively, he had improvement in his radicular pain.  He was up independently at the time of discharge, wound healing nicely and neurologically intact.      DISCHARGE DISPOSITION:  Discharged home where he will be encouraged to continue with his walking program.  He will be discharged with tramadol to receive 20 of those.  He will return for followup in approximately 2 weeks' time.         SOBIA GARRETT MD             D: 2020   T: 2020   MT:       Name:     DALIA DIAZ   MRN:      [id number]        Account:        [id number]   :      1960           Admit Date:     2020                                  Discharge Date:       Document: [id number]       cc: Sim Humphrey MD

## 2020-09-02 NOTE — PLAN OF CARE
Nursing shift note  POD 1 from a C6-C7 ASF and decompression w/ Dr. Hernandez. A&Ox4. CMS intact. Bowel sounds +, - flatus, tolerating regular diet. VSS on R/A. Dressing CDI. Up independently. Voiding adequately. C/o neck pain, decreased with scheduled tylenol. Pt scoring green on the Aggression Stop Light Tool. Plan to go home at discharge.       Pt's belongings:   (Belongings from admission day present in pt's room)                 Home medications: No

## 2020-10-07 PROBLEM — M54.2 NECK PAIN: Status: RESOLVED | Noted: 2020-06-02 | Resolved: 2020-10-07

## 2020-10-07 NOTE — PROGRESS NOTES
Discharge Note    Progress reporting period is from initial evaluation date (please see noted date below) to Jun 24, 2020.  Linked Episodes   Type: Episode: Status: Noted: Resolved: Last update: Updated by:   PHYSICAL THERAPY Neck with bilateral arm pain-6/2/2020 Active 6/2/2020 6/24/2020 11:57 AM Sophia De Jesus PT      Comments:       Noe failed to follow up and current status is unknown.  Please see information below for last relevant information on current status.  Patient seen for 3 visits.    SUBJECTIVE  Subjective changes noted by patient:  Patient reports he followed up with ortho and reports he recommended surgical options.  Continues to complete exercises multiple times/day.   .  Current pain level is 2/10(up to 7/10 PL).     Previous pain level was  7/10.   Changes in function:  Yes (See Goal flowsheet attached for changes in current functional level)  Adverse reaction to treatment or activity: None    OBJECTIVE  Changes noted in objective findings: CROM Flexion nil loss Retraction min-moderate loss  Exten moderate loss Rotation (L) min-mod  loss (R) min loss     ASSESSMENT/PLAN  Diagnosis: Neck pain   Updated problem list and treatment plan:     STG/LTGs have been met or progress has been made towards goals:  Yes, please see goal flowsheet for most current information  Assessment of Progress: current status is unknown.    Last current status:     Self Management Plans:  HEP  I have re-evaluated this patient and find that the nature, scope, duration and intensity of the therapy is appropriate for the medical condition of the patient.  Noe continues to require the following intervention to meet STG and LTG's:  HEP.    Recommendations:  Discharge with current home program.  Patient to follow up with MD as needed.    Please refer to the daily flowsheet for treatment today, total treatment time and time spent performing 1:1 timed codes.

## 2021-12-03 DIAGNOSIS — Z11.59 ENCOUNTER FOR SCREENING FOR OTHER VIRAL DISEASES: ICD-10-CM

## 2021-12-07 ENCOUNTER — TRANSFERRED RECORDS (OUTPATIENT)
Dept: MULTI SPECIALTY CLINIC | Facility: CLINIC | Age: 61
End: 2021-12-07
Payer: COMMERCIAL

## 2021-12-07 LAB
CREATININE (EXTERNAL): 0.77 MG/DL (ref 0.76–1.27)
GFR ESTIMATED (EXTERNAL): 97 ML/MIN/1.73M2
GFR ESTIMATED (IF AFRICAN AMERICAN) (EXTERNAL): 112 ML/MIN/1.73M2
GLUCOSE (EXTERNAL): 80 MG/DL (ref 65–99)
POTASSIUM (EXTERNAL): 5 MMOL/L (ref 3.5–5.2)

## 2021-12-11 ENCOUNTER — LAB (OUTPATIENT)
Dept: URGENT CARE | Facility: URGENT CARE | Age: 61
End: 2021-12-11
Attending: ORTHOPAEDIC SURGERY
Payer: COMMERCIAL

## 2021-12-11 DIAGNOSIS — Z11.59 ENCOUNTER FOR SCREENING FOR OTHER VIRAL DISEASES: ICD-10-CM

## 2021-12-11 PROCEDURE — U0003 INFECTIOUS AGENT DETECTION BY NUCLEIC ACID (DNA OR RNA); SEVERE ACUTE RESPIRATORY SYNDROME CORONAVIRUS 2 (SARS-COV-2) (CORONAVIRUS DISEASE [COVID-19]), AMPLIFIED PROBE TECHNIQUE, MAKING USE OF HIGH THROUGHPUT TECHNOLOGIES AS DESCRIBED BY CMS-2020-01-R: HCPCS

## 2021-12-11 PROCEDURE — U0005 INFEC AGEN DETEC AMPLI PROBE: HCPCS

## 2021-12-12 LAB — SARS-COV-2 RNA RESP QL NAA+PROBE: NEGATIVE

## 2021-12-13 ENCOUNTER — ANESTHESIA EVENT (OUTPATIENT)
Dept: SURGERY | Facility: CLINIC | Age: 61
End: 2021-12-13
Payer: COMMERCIAL

## 2021-12-14 ENCOUNTER — ANESTHESIA (OUTPATIENT)
Dept: SURGERY | Facility: CLINIC | Age: 61
End: 2021-12-14
Payer: COMMERCIAL

## 2021-12-14 ENCOUNTER — HOSPITAL ENCOUNTER (OUTPATIENT)
Facility: CLINIC | Age: 61
Discharge: HOME | End: 2021-12-14
Attending: ORTHOPAEDIC SURGERY | Admitting: ORTHOPAEDIC SURGERY
Payer: COMMERCIAL

## 2021-12-14 VITALS
WEIGHT: 288.5 LBS | HEIGHT: 76 IN | HEART RATE: 83 BPM | RESPIRATION RATE: 12 BRPM | BODY MASS INDEX: 35.13 KG/M2 | OXYGEN SATURATION: 94 % | DIASTOLIC BLOOD PRESSURE: 69 MMHG | TEMPERATURE: 97.6 F | SYSTOLIC BLOOD PRESSURE: 119 MMHG

## 2021-12-14 DIAGNOSIS — Z98.890 POST-OPERATIVE STATE: Primary | ICD-10-CM

## 2021-12-14 PROCEDURE — 250N000011 HC RX IP 250 OP 636: Performed by: ANESTHESIOLOGY

## 2021-12-14 PROCEDURE — 250N000013 HC RX MED GY IP 250 OP 250 PS 637: Performed by: PHYSICIAN ASSISTANT

## 2021-12-14 PROCEDURE — C1713 ANCHOR/SCREW BN/BN,TIS/BN: HCPCS | Performed by: ORTHOPAEDIC SURGERY

## 2021-12-14 PROCEDURE — 250N000011 HC RX IP 250 OP 636: Performed by: PHYSICIAN ASSISTANT

## 2021-12-14 PROCEDURE — 360N000077 HC SURGERY LEVEL 4, PER MIN: Performed by: ORTHOPAEDIC SURGERY

## 2021-12-14 PROCEDURE — C9290 INJ, BUPIVACAINE LIPOSOME: HCPCS | Performed by: ANESTHESIOLOGY

## 2021-12-14 PROCEDURE — 370N000017 HC ANESTHESIA TECHNICAL FEE, PER MIN: Performed by: ORTHOPAEDIC SURGERY

## 2021-12-14 PROCEDURE — 250N000011 HC RX IP 250 OP 636: Performed by: NURSE ANESTHETIST, CERTIFIED REGISTERED

## 2021-12-14 PROCEDURE — 710N000009 HC RECOVERY PHASE 1, LEVEL 1, PER MIN: Performed by: ORTHOPAEDIC SURGERY

## 2021-12-14 PROCEDURE — 250N000009 HC RX 250: Performed by: NURSE ANESTHETIST, CERTIFIED REGISTERED

## 2021-12-14 PROCEDURE — 272N000001 HC OR GENERAL SUPPLY STERILE: Performed by: ORTHOPAEDIC SURGERY

## 2021-12-14 PROCEDURE — 250N000009 HC RX 250: Performed by: ORTHOPAEDIC SURGERY

## 2021-12-14 PROCEDURE — 710N000012 HC RECOVERY PHASE 2, PER MINUTE: Performed by: ORTHOPAEDIC SURGERY

## 2021-12-14 PROCEDURE — C1762 CONN TISS, HUMAN(INC FASCIA): HCPCS | Performed by: ORTHOPAEDIC SURGERY

## 2021-12-14 PROCEDURE — 258N000003 HC RX IP 258 OP 636: Performed by: ANESTHESIOLOGY

## 2021-12-14 PROCEDURE — 250N000011 HC RX IP 250 OP 636: Performed by: ORTHOPAEDIC SURGERY

## 2021-12-14 PROCEDURE — 258N000003 HC RX IP 258 OP 636: Performed by: NURSE ANESTHETIST, CERTIFIED REGISTERED

## 2021-12-14 PROCEDURE — 999N000141 HC STATISTIC PRE-PROCEDURE NURSING ASSESSMENT: Performed by: ORTHOPAEDIC SURGERY

## 2021-12-14 PROCEDURE — 250N000009 HC RX 250: Performed by: ANESTHESIOLOGY

## 2021-12-14 DEVICE — IMPLANTABLE DEVICE: Type: IMPLANTABLE DEVICE | Site: SHOULDER | Status: FUNCTIONAL

## 2021-12-14 DEVICE — SPEEDBRG IMP SYS W/BIO-COMP SWVLK
Type: IMPLANTABLE DEVICE | Site: SHOULDER | Status: FUNCTIONAL
Brand: ARTHREX®

## 2021-12-14 DEVICE — GRAFT DECELLULARIZED DERMIS ARTHROFLEX 40X70X 2-3MM AFLEX301: Type: IMPLANTABLE DEVICE | Site: SHOULDER | Status: FUNCTIONAL

## 2021-12-14 RX ORDER — FENTANYL CITRATE 0.05 MG/ML
25 INJECTION, SOLUTION INTRAMUSCULAR; INTRAVENOUS
Status: DISCONTINUED | OUTPATIENT
Start: 2021-12-14 | End: 2021-12-14 | Stop reason: HOSPADM

## 2021-12-14 RX ORDER — SODIUM CHLORIDE, SODIUM LACTATE, POTASSIUM CHLORIDE, CALCIUM CHLORIDE 600; 310; 30; 20 MG/100ML; MG/100ML; MG/100ML; MG/100ML
INJECTION, SOLUTION INTRAVENOUS CONTINUOUS
Status: DISCONTINUED | OUTPATIENT
Start: 2021-12-14 | End: 2021-12-14 | Stop reason: HOSPADM

## 2021-12-14 RX ORDER — BUPIVACAINE HYDROCHLORIDE 5 MG/ML
INJECTION, SOLUTION EPIDURAL; INTRACAUDAL
Status: COMPLETED | OUTPATIENT
Start: 2021-12-14 | End: 2021-12-14

## 2021-12-14 RX ORDER — FENTANYL CITRATE 0.05 MG/ML
25 INJECTION, SOLUTION INTRAMUSCULAR; INTRAVENOUS EVERY 5 MIN PRN
Status: DISCONTINUED | OUTPATIENT
Start: 2021-12-14 | End: 2021-12-14 | Stop reason: HOSPADM

## 2021-12-14 RX ORDER — ONDANSETRON 4 MG/1
4-8 TABLET, ORALLY DISINTEGRATING ORAL EVERY 8 HOURS PRN
Qty: 10 TABLET | Refills: 0 | Status: SHIPPED | OUTPATIENT
Start: 2021-12-14

## 2021-12-14 RX ORDER — CELECOXIB 200 MG/1
400 CAPSULE ORAL ONCE
Status: COMPLETED | OUTPATIENT
Start: 2021-12-14 | End: 2021-12-14

## 2021-12-14 RX ORDER — ONDANSETRON 2 MG/ML
INJECTION INTRAMUSCULAR; INTRAVENOUS PRN
Status: DISCONTINUED | OUTPATIENT
Start: 2021-12-14 | End: 2021-12-14

## 2021-12-14 RX ORDER — BUPIVACAINE HYDROCHLORIDE AND EPINEPHRINE 2.5; 5 MG/ML; UG/ML
INJECTION, SOLUTION INFILTRATION; PERINEURAL PRN
Status: DISCONTINUED | OUTPATIENT
Start: 2021-12-14 | End: 2021-12-14 | Stop reason: HOSPADM

## 2021-12-14 RX ORDER — ACETAMINOPHEN 325 MG/1
975 TABLET ORAL ONCE
Status: COMPLETED | OUTPATIENT
Start: 2021-12-14 | End: 2021-12-14

## 2021-12-14 RX ORDER — CEFAZOLIN SODIUM IN 0.9 % NACL 3 G/100 ML
3 INTRAVENOUS SOLUTION, PIGGYBACK (ML) INTRAVENOUS SEE ADMIN INSTRUCTIONS
Status: DISCONTINUED | OUTPATIENT
Start: 2021-12-14 | End: 2021-12-14 | Stop reason: HOSPADM

## 2021-12-14 RX ORDER — PROPOFOL 10 MG/ML
INJECTION, EMULSION INTRAVENOUS CONTINUOUS PRN
Status: DISCONTINUED | OUTPATIENT
Start: 2021-12-14 | End: 2021-12-14

## 2021-12-14 RX ORDER — LIDOCAINE HYDROCHLORIDE 20 MG/ML
INJECTION, SOLUTION INFILTRATION; PERINEURAL PRN
Status: DISCONTINUED | OUTPATIENT
Start: 2021-12-14 | End: 2021-12-14

## 2021-12-14 RX ORDER — HYDROMORPHONE HCL IN WATER/PF 6 MG/30 ML
0.2 PATIENT CONTROLLED ANALGESIA SYRINGE INTRAVENOUS EVERY 5 MIN PRN
Status: DISCONTINUED | OUTPATIENT
Start: 2021-12-14 | End: 2021-12-14 | Stop reason: HOSPADM

## 2021-12-14 RX ORDER — ONDANSETRON 2 MG/ML
4 INJECTION INTRAMUSCULAR; INTRAVENOUS EVERY 30 MIN PRN
Status: DISCONTINUED | OUTPATIENT
Start: 2021-12-14 | End: 2021-12-14 | Stop reason: HOSPADM

## 2021-12-14 RX ORDER — GLYCOPYRROLATE 0.2 MG/ML
INJECTION, SOLUTION INTRAMUSCULAR; INTRAVENOUS PRN
Status: DISCONTINUED | OUTPATIENT
Start: 2021-12-14 | End: 2021-12-14

## 2021-12-14 RX ORDER — LIDOCAINE 40 MG/G
CREAM TOPICAL
Status: DISCONTINUED | OUTPATIENT
Start: 2021-12-14 | End: 2021-12-14 | Stop reason: HOSPADM

## 2021-12-14 RX ORDER — HYDROCODONE BITARTRATE AND ACETAMINOPHEN 5; 325 MG/1; MG/1
1 TABLET ORAL
Status: DISCONTINUED | OUTPATIENT
Start: 2021-12-14 | End: 2021-12-14 | Stop reason: HOSPADM

## 2021-12-14 RX ORDER — ONDANSETRON 4 MG/1
4 TABLET, ORALLY DISINTEGRATING ORAL
Status: DISCONTINUED | OUTPATIENT
Start: 2021-12-14 | End: 2021-12-14 | Stop reason: HOSPADM

## 2021-12-14 RX ORDER — ONDANSETRON 4 MG/1
4 TABLET, ORALLY DISINTEGRATING ORAL EVERY 30 MIN PRN
Status: DISCONTINUED | OUTPATIENT
Start: 2021-12-14 | End: 2021-12-14 | Stop reason: HOSPADM

## 2021-12-14 RX ORDER — EPHEDRINE SULFATE 50 MG/ML
INJECTION, SOLUTION INTRAMUSCULAR; INTRAVENOUS; SUBCUTANEOUS PRN
Status: DISCONTINUED | OUTPATIENT
Start: 2021-12-14 | End: 2021-12-14

## 2021-12-14 RX ORDER — HYDROCODONE BITARTRATE AND ACETAMINOPHEN 5; 325 MG/1; MG/1
1-2 TABLET ORAL EVERY 4 HOURS PRN
Qty: 50 TABLET | Refills: 0 | Status: SHIPPED | OUTPATIENT
Start: 2021-12-14

## 2021-12-14 RX ORDER — PROPOFOL 10 MG/ML
INJECTION, EMULSION INTRAVENOUS PRN
Status: DISCONTINUED | OUTPATIENT
Start: 2021-12-14 | End: 2021-12-14

## 2021-12-14 RX ORDER — CEFAZOLIN SODIUM IN 0.9 % NACL 3 G/100 ML
3 INTRAVENOUS SOLUTION, PIGGYBACK (ML) INTRAVENOUS
Status: COMPLETED | OUTPATIENT
Start: 2021-12-14 | End: 2021-12-14

## 2021-12-14 RX ORDER — MEPERIDINE HYDROCHLORIDE 25 MG/ML
12.5 INJECTION INTRAMUSCULAR; INTRAVENOUS; SUBCUTANEOUS
Status: DISCONTINUED | OUTPATIENT
Start: 2021-12-14 | End: 2021-12-14 | Stop reason: HOSPADM

## 2021-12-14 RX ORDER — FENTANYL CITRATE 0.05 MG/ML
50 INJECTION, SOLUTION INTRAMUSCULAR; INTRAVENOUS
Status: DISCONTINUED | OUTPATIENT
Start: 2021-12-14 | End: 2021-12-14 | Stop reason: HOSPADM

## 2021-12-14 RX ORDER — AMOXICILLIN 250 MG
1-2 CAPSULE ORAL 2 TIMES DAILY
Qty: 50 TABLET | Refills: 0 | Status: SHIPPED | OUTPATIENT
Start: 2021-12-14

## 2021-12-14 RX ORDER — FENTANYL CITRATE 50 UG/ML
INJECTION, SOLUTION INTRAMUSCULAR; INTRAVENOUS PRN
Status: DISCONTINUED | OUTPATIENT
Start: 2021-12-14 | End: 2021-12-14

## 2021-12-14 RX ORDER — NAPROXEN 250 MG/1
250 TABLET ORAL 2 TIMES DAILY WITH MEALS
Status: CANCELLED | OUTPATIENT
Start: 2021-12-14

## 2021-12-14 RX ADMIN — FENTANYL CITRATE 50 MCG: 0.05 INJECTION, SOLUTION INTRAMUSCULAR; INTRAVENOUS at 07:36

## 2021-12-14 RX ADMIN — Medication 5 MG: at 08:32

## 2021-12-14 RX ADMIN — SODIUM CHLORIDE, POTASSIUM CHLORIDE, SODIUM LACTATE AND CALCIUM CHLORIDE: 600; 310; 30; 20 INJECTION, SOLUTION INTRAVENOUS at 08:00

## 2021-12-14 RX ADMIN — PROPOFOL 40 MG: 10 INJECTION, EMULSION INTRAVENOUS at 10:10

## 2021-12-14 RX ADMIN — MIDAZOLAM HYDROCHLORIDE 1 MG: 1 INJECTION, SOLUTION INTRAMUSCULAR; INTRAVENOUS at 07:36

## 2021-12-14 RX ADMIN — ACETAMINOPHEN 975 MG: 325 TABLET, FILM COATED ORAL at 07:00

## 2021-12-14 RX ADMIN — BUPIVACAINE HYDROCHLORIDE 10 ML: 5 INJECTION, SOLUTION EPIDURAL; INTRACAUDAL; PERINEURAL at 07:45

## 2021-12-14 RX ADMIN — Medication 5 MG: at 08:25

## 2021-12-14 RX ADMIN — Medication 5 MG: at 09:18

## 2021-12-14 RX ADMIN — PHENYLEPHRINE HYDROCHLORIDE 0.3 MCG/KG/MIN: 10 INJECTION INTRAVENOUS at 08:43

## 2021-12-14 RX ADMIN — MIDAZOLAM 2 MG: 1 INJECTION INTRAMUSCULAR; INTRAVENOUS at 08:09

## 2021-12-14 RX ADMIN — FENTANYL CITRATE 50 MCG: 50 INJECTION, SOLUTION INTRAMUSCULAR; INTRAVENOUS at 10:13

## 2021-12-14 RX ADMIN — LIDOCAINE HYDROCHLORIDE 40 MG: 20 INJECTION, SOLUTION INFILTRATION; PERINEURAL at 08:10

## 2021-12-14 RX ADMIN — Medication 5 MG: at 10:19

## 2021-12-14 RX ADMIN — SODIUM CHLORIDE, POTASSIUM CHLORIDE, SODIUM LACTATE AND CALCIUM CHLORIDE: 600; 310; 30; 20 INJECTION, SOLUTION INTRAVENOUS at 10:09

## 2021-12-14 RX ADMIN — Medication 5 MG: at 09:52

## 2021-12-14 RX ADMIN — GLYCOPYRROLATE 0.1 MG: 0.2 INJECTION, SOLUTION INTRAMUSCULAR; INTRAVENOUS at 08:16

## 2021-12-14 RX ADMIN — CELECOXIB 400 MG: 200 CAPSULE ORAL at 07:00

## 2021-12-14 RX ADMIN — Medication 5 MG: at 08:43

## 2021-12-14 RX ADMIN — BUPIVACAINE 10 ML: 13.3 INJECTION, SUSPENSION, LIPOSOMAL INFILTRATION at 07:45

## 2021-12-14 RX ADMIN — ONDANSETRON 4 MG: 2 INJECTION INTRAMUSCULAR; INTRAVENOUS at 10:26

## 2021-12-14 RX ADMIN — Medication 5 MG: at 10:30

## 2021-12-14 RX ADMIN — PROPOFOL 75 MCG/KG/MIN: 10 INJECTION, EMULSION INTRAVENOUS at 08:10

## 2021-12-14 RX ADMIN — GLYCOPYRROLATE 0.1 MG: 0.2 INJECTION, SOLUTION INTRAMUSCULAR; INTRAVENOUS at 08:10

## 2021-12-14 RX ADMIN — Medication 3 G: at 08:05

## 2021-12-14 ASSESSMENT — MIFFLIN-ST. JEOR: SCORE: 2205.13

## 2021-12-14 NOTE — DISCHARGE INSTRUCTIONS
Same Day Surgery Discharge Instructions for  Sedation and General Anesthesia       It's not unusual to feel dizzy, light-headed or faint for up to 24 hours after surgery or while taking pain medication.  If you have these symptoms: sit for a few minutes before standing and have someone assist you when you get up to walk or use the bathroom.      You should rest and relax for the next 24 hours. We recommend you make arrangements to have an adult stay with you for at least 24 hours after your discharge.  Avoid hazardous and strenuous activity.      DO NOT DRIVE any vehicle or operate mechanical equipment for 24 hours following the end of your surgery.  Even though you may feel normal, your reactions may be affected by the medication you have received.      Do not drink alcoholic beverages for 24 hours following surgery.       Slowly progress to your regular diet as you feel able. It's not unusual to feel nauseated and/or vomit after receiving anesthesia.  If you develop these symptoms, drink clear liquids (apple juice, ginger ale, broth, 7-up, etc. ) until you feel better.  If your nausea and vomiting persists for 24 hours, please notify your surgeon.        All narcotic pain medications, along with inactivity and anesthesia, can cause constipation. Drinking plenty of liquids and increasing fiber intake will help.      For any questions of a medical nature, call your surgeon.      Do not make important decisions for 24 hours.      If you had general anesthesia, you may have a sore throat for a couple of days related to the breathing tube used during surgery.  You may use Cepacol lozenges to help with this discomfort.  If it worsens or if you develop a fever, contact your surgeon.       If you feel your pain is not well managed with the pain medications prescribed by your surgeon, please contact your surgeon's office to let them know so they can address your concerns.       CoVid 19 Information    We want to give you  information regarding Covid. Please consult your primary care provider with any questions you might have.     Patient who have symptoms (cough, fever, or shortness of breath), need to isolate for 7 days from when symptoms started OR 72 hours after fever resolves (without fever reducing medications) AND improvement of respiratory symptoms (whichever is longer).      Isolate yourself at home (in own room/own bathroom if possible)    Do Not allow any visitors    Do Not go to work or school    Do Not go to [employer],  centers, shopping, or other public places.    Do Not shake hands.    Avoid close and intimate contact with others (hugging, kissing).    Follow CDC recommendations for household cleaning of frequently touched services.     After the initial 7 days, continue to isolate yourself from household members as much as possible. To continue decrease the risk of community spread and exposure, you and any members of your household should limit activities in public for 14 days after starting home isolation.     You can reference the following CDC link for helpful home isolation/care tips:  https://www.cdc.gov/coronavirus/2019-ncov/downloads/10Things.pdf    Protect Others:    Cover Your Mouth and Nose with a mask, disposable tissue or wash cloth to avoid spreading germs to others.    Wash your hands and face frequently with soap and water    Call Your Primary Doctor If: Breathing difficulty develops or you become worse.    For more information about COVID19 and options for caring for yourself at home, please visit the CDC website at https://www.cdc.gov/coronavirus/2019-ncov/about/steps-when-sick.html  For more options for care at [facility], please visit our website at [email or web address]      While you were at the hospital today you received 975 mg Tylenol at 7:00 am. Maximum daily dosage is 4000 mg.    While you were at the hospital today you received Celebrex at 7:00 am, an  antiinflammatory medication similar to Ibuprofen.  You should not take other antiinflammatory medication, such as Ibuprofen, Motrin, Advil, Aleve, Naprosyn, etc, for at least 8 hours.           **If you have questions or concerns about your procedure, please contact Dr Dylan Bates [phone].**

## 2021-12-14 NOTE — OR NURSING
discharge to home. Discharge instructions to pt and pt wife via phone due to COVID restrictions.  No questions or concerns. Pt denies pain or discomfort. Sealed Prescriptions placed in pt belongings with discharge instructions.

## 2021-12-14 NOTE — ANESTHESIA PROCEDURE NOTES
Interscalene Procedure Note    Pre-Procedure   Staff -        Anesthesiologist:  Noe Moffett MD       Performed By: anesthesiologist       Location: pre-op       Procedure Start/Stop Times: 12/14/2021 7:35 AM and 12/14/2021 7:45 AM       Pre-Anesthestic Checklist: patient identified, IV checked, site marked, risks and benefits discussed, informed consent, monitors and equipment checked, pre-op evaluation and at physician/surgeon's request  Timeout:       Correct Patient: Yes        Correct Procedure: Yes        Correct Site: Yes        Correct Position: Yes        Correct Laterality: Yes        Site Marked: Yes  Procedure Documentation  Procedure: Interscalene       Laterality: right       Patient Position: lateral       Skin prep: Chloraprep       Local skin infiltrated with 3 mL of 1% lidocaine.        Needle Type: insulated and short bevel       Needle Gauge: 21.        Needle Length (Inches): 4        Ultrasound guided       1. Ultrasound was used to identify targeted nerve, plexus, vascular marker, or fascial plane and place a needle adjacent to it in real-time.       2. Ultrasound was used to visualize the spread of anesthetic in close proximity to the above referenced structure.       3. A permanent image is entered into the patient's record.       4. The visualized anatomic structures appeared normal.       5. There were no apparent abnormal pathologic findings.    Assessment/Narrative         The placement was negative for: blood aspirated, painful injection and site bleeding       Paresthesias: No.     Bolus given via needle..        Secured via.        Insertion/Infusion Method: Single Shot       Complications: none       Injection made incrementally with aspirations every 3 mL.    Medication(s) Administered   Bupivacaine 0.5% PF (Infiltration), 10 mL  Bupivacaine liposome (Exparel) 1.3% LA inj susp (Infiltration), 10 mL  Medication Administration Time: 12/14/2021 7:45 AM     Comments:  Pt  tolerated well. No motor response obtained with nerve stimulator.

## 2021-12-14 NOTE — ANESTHESIA POSTPROCEDURE EVALUATION
Patient: Noe Diaz    Procedure: Procedure(s):  RIGHT SHOULDER ARTHOSCOPIC SUPERIOR CAPSULAR RECONSTRUCTION       Diagnosis:Complete tear of right rotator cuff, unspecified whether traumatic [M75.121]  Diagnosis Additional Information: No value filed.    Anesthesia Type:  Peripheral Nerve Block    Note:  Disposition: Outpatient   Postop Pain Control: Uneventful            Sign Out: Well controlled pain   PONV: No   Neuro/Psych: Uneventful            Sign Out: Acceptable/Baseline neuro status   Airway/Respiratory: Uneventful            Sign Out: Acceptable/Baseline resp. status   CV/Hemodynamics: Uneventful            Sign Out: Acceptable CV status   Other NRE: NONE   DID A NON-ROUTINE EVENT OCCUR? No    Event details/Postop Comments:  Patient doing great. Comfortable with block.           Last vitals:  Vitals Value Taken Time   /69 12/14/21 1115   Temp 36.2  C (97.1  F) 12/14/21 1051   Pulse 55 12/14/21 1118   Resp 17 12/14/21 1118   SpO2 96 % 12/14/21 1119   Vitals shown include unvalidated device data.    Electronically Signed By: Noe Moffett MD  December 14, 2021  11:59 AM

## 2021-12-14 NOTE — ANESTHESIA CARE TRANSFER NOTE
Patient: Noe Diaz    Procedure: Procedure(s):  RIGHT SHOULDER ARTHOSCOPIC SUPERIOR CAPSULAR RECONSTRUCTION       Diagnosis: Complete tear of right rotator cuff, unspecified whether traumatic [M75.121]  Diagnosis Additional Information: No value filed.    Anesthesia Type:   Peripheral Nerve Block     Note:    Oropharynx: oropharynx clear of all foreign objects and spontaneously breathing  Level of Consciousness: awake  Oxygen Supplementation: room air    Independent Airway: airway patency satisfactory and stable  Dentition: dentition unchanged  Vital Signs Stable: post-procedure vital signs reviewed and stable  Report to RN Given: handoff report given  Patient transferred to: PACU    Handoff Report: Identifed the Patient, Identified the Reponsible Provider, Reviewed the pertinent medical history, Discussed the surgical course, Reviewed Intra-OP anesthesia mangement and issues during anesthesia, Set expectations for post-procedure period and Allowed opportunity for questions and acknowledgement of understanding      Vitals:  Vitals Value Taken Time   /69 12/14/21 1051   Temp     Pulse 76 12/14/21 1055   Resp 10 12/14/21 1055   SpO2 95 % 12/14/21 1055   Vitals shown include unvalidated device data.    Electronically Signed By: PIETER Dobson CRNA  December 14, 2021  10:56 AM

## 2021-12-14 NOTE — OP NOTE
POST OPERATIVE NOTE-IMMEDIATE :    Preoperative Diagnosis:  Complete tear of right rotator cuff, unspecified whether traumatic [M75.121]    Postoperative Diagnosis:  Same    Procedures:  Right shoulder scope superior capsular reconstruction  SAD  DCE    Prosthetic Devices:  See scanned implant documents    Surgeon(s) and Assistants (if any):    Surgeon(s):  Saúl Ivey, John Bates, Dylan Hopkins MD    Anesthesia:  Scalene    Drains:  None    Specimens:  None    Complications:  None.    Findings/Conclusions:  See operative note    Estimated Blood Loss:       Condition on discharge from OR:  Satisfactory    Plan:   1. Antibiotic Prophylaxis were given Ancef 2 gm. Abx given within 1 hr of surgical incision and discontinued within 24hrs.   2. DVT prophylaxis ASA will be started within 24hrs of surgery.  3. Weight Bearing Non-weight bearing  4. Discharge anticipated date 12/14/2021 Home  5. Pain Medication Oxycodone/APAP    Dylan Bates MD, MD

## 2021-12-14 NOTE — OP NOTE
Procedure Date: 12/14/2021    SURGEON:  Dylan Bates MD    FIRST ASSISTANT:  Simone Watts PA-C.  Please bill for Mr. Watts as first assist as he was critical for patient transfer, positioning, prepping, draping, intraoperative exposure, graft preparation, graft placement, wound closure, and transfer.  OPA Mr. John Jaramillo, OPA.    PREOPERATIVE DIAGNOSES:     1.  Massive irreparable right rotator cuff tear.  2.  Right shoulder impingement.  3.  Right shoulder acromioclavicular arthritis.    POSTOPERATIVE DIAGNOSES:     1.  Massive irreparable right rotator cuff tear.  2.  Right shoulder impingement.  3.  Right shoulder acromioclavicular arthritis.    PROCEDURES:     1.  Diagnostic right shoulder arthroscopy.  2.  Right shoulder arthroscopic subacromial decompression.  3.  Right shoulder arthroscopic distal clavicle excision.  4.  Right shoulder arthroscopic superior capsular reconstruction.    INDICATIONS FOR SURGERY:  Mr. Diaz is a 61-year-old male who has been having increasing discomfort in his shoulder.  For more complete account of his history of present illness, please see his clinic note.  He was seen on his MRI to have a high riding humeral head with significant atrophy of both the supraspinatus and infraspinatus and retraction beyond the rim of the glenoid.  Therefore, the rotator cuff was felt to be not reconstructable.  I presented with the operative choices of reverse total shoulder versus superior capsular reconstruction, and we both agreed that for him, the superior capsular reconstruction would be the optimal choice.  Informed consent was obtained.    ANESTHETIC:  Interscalene block with sedation.    FINDINGS:     1.  Anterior triangle -- moderate amount of synovitis.  The subscapularis had some very mild fraying at the insertion.  Otherwise, intact.  2.  Glenoid -- no chondromalacia or bald spot.  3.  Humeral head -- no chondromalacia, no bare with a Hill-Sachs lesion.  4.  Axillary pouch  definitely bodies, no synovitis.  5.  Posterior labrum intact with fraying.  6.  Biceps labral complex, the proximal long head of biceps was absent as it had been chronically ruptured.  The labrum showed type 1 SLAP changes.  7.  Rotator cuff.  The subscapularis and teres minor were still intact; however, the infraspinatus and supraspinatus were both retracted beyond the level of the glenoid.  8.  Subacromial space.  There was a large osteophyte complex off the AC joint with synovitis in the AC joint.  The distal clavicle had grade 4 chondromalacia.    DESCRIPTION OF PROCEDURE:  Mr. Diaz was correctly identified by myself.  His right upper extremity was marked.  Interscalene block was administered.  He was then taken to the operating room where he was placed in a beach chair position, sedated.  Antibiotics were administered.  He was then prepped and draped in the usual fashion.  A timeout was then performed.  Portal sites were injected with 0.25% Marcaine with epinephrine followed by induction of the trocars.  Diagnostic arthroscopy was then carried out with above-named findings.    The scope was then placed in the subacromial space.  A working lateral portal was created under direct visualization.  Through this, a cautery wand was utilized to perform the bursectomy and released the superior capsule off the AC joint.  I was very aggressive with this to give us very good exposure during our superior capsular reconstruction.    The 6 mm bur was then brought into the lateral portal, and the greater tuberosity was roughened down to a nice bed of bleeding cortical cancellous bone.    The scope was then brought in laterally.  The 6 mm bur was brought in posteriorly and a cutting block technique was utilized to perform the acromioplasty with a type 3 acromion to a type 1 acromion.    A portal was then made directly anterior and adjacent to the AC joint.  Through this, a distal 8 mm of distal clavicle was excised.  The  adequacy of this excision was confirmed as viewed from the anterior portal as well.  Through this, I also roughened thoroughly the superior aspect of the glenoid down to a nice bed of bleeding cortical cancellous bone as well.    A portal of [place] was made, and through this one 1.8 SutureTak was placed at the 12 o'clock position on the glenoid, and then through the anterior incision where we did the distal clavicle excision, we placed another anchor at approximately 2 o'clock and then we created a separate posterior portal and placed another anchor at approximately 10 o'clock in the glenoid, all with excellent purchase.  The scope was then placed posteriorly.  I placed two lateral row anchors adjacent to the chondral surface of the humerus, one anteriorly and one posteriorly.  We then measured the distances between all of these sutures and marked these on our graft.  The graft was then prepared in the usual technique and all holes for sutures were then punched.  We passed the sutures from the knotless FiberTaks medially through the graft, taking care to make sure that there were no twists and pulled the graft successfully in anchoring down the graft completely along the rim of the glenoid with the edge of the supraspinatus and infraspinatus over the top.  I then passed the sutures from the lateral row anchors through the cuff using the Scorpion and then took sutures from each and placed them through a lateral row giving us a very nice watertight closure.  I then placed two #2 FiberWires through the anterior aspect of the graft and the upper rim of the subscapularis, which tensioned our graft quite nicely anteriorly.  I then did the same thing posteriorly through the teres minor and through the graft, which pulled the teres minor up quite nicely and reduced the size of the rotator cuff tear significantly and also tensioned the graft.  Sutures were all cut appropriately.  The trocars were removed from the  shoulder.  Wounds were closed with buried 3-0 Monocryl, Steri-Strips as well as sterile dressings and UltraSling.  Sponge counts correct.  The patient was taken to postanesthesia recovery in stable condition.    COMPLICATIONS:  None.    SPECIMENS:  None.    BLOOD LOSS:  Less than 5 mL.    POSTOPERATIVE PLAN:  He will undergo the decelerated rotator cuff repair protocol per SCR.    Dylan Bates MD        D: 2021   T: 2021   MT: DEEJAY    Name:     DALIA NUNEZ  MRN:      [id number]        Account:        [id number]   :      1960           Procedure Date: 2021     Document: [id number]

## 2021-12-14 NOTE — ANESTHESIA PREPROCEDURE EVALUATION
Anesthesia Pre-Procedure Evaluation    Patient: Noe Diaz   MRN: [id number] : 1960        Preoperative Diagnosis: Complete tear of right rotator cuff, unspecified whether traumatic [M75.121]    Procedure : Procedure(s):  RIGHT SHOULDER ARTHOSCOPIC SUPERIOR CAPSULAR RECONSTRUCTION          Past Medical History:   Diagnosis Date     Abnormal glucose      Acute sinusitis      Benign essential hypertension      Cervical radiculitis      Chest pain, atypical      Decreased sex drive      Dysuria      Elevated liver enzymes      Esophageal reflux      Generalized anxiety disorder      Hematoma, subungual, thumb, left      Insomnia      Left-sided chest pain      Low testosterone in male      Obesity      Pure hypercholesterolemia      Rhinitis, chronic      Splenomegaly      Thumb injury, left, initial encounter       Past Surgical History:   Procedure Laterality Date     DECOMPRESSION, FUSION CERVICAL ANTERIOR ONE LEVEL, COMBINED N/A 2020    Procedure: ANTERIOR CERVICAL DECOMPRESSION AND FUSION CERVICAL 6-CERVICAL 7;  Surgeon: John Hernandez MD;  Location: [facility]     HERNIA REPAIR       ORTHOPEDIC SURGERY      lumber discectomy      No Known Allergies   Social History     Tobacco Use     Smoking status: Never Smoker     Smokeless tobacco: Never Used   Substance Use Topics     Alcohol use: Yes     Comment: 5 drinks/week      Wt Readings from Last 1 Encounters:   21 130.9 kg (288 lb 8 oz)        Anesthesia Evaluation   Pt has had prior anesthetic.     No history of anesthetic complications       ROS/MED HX  ENT/Pulmonary:  - neg pulmonary ROS     Neurologic:  - neg neurologic ROS     Cardiovascular:     (+) hypertension-----    METS/Exercise Tolerance:     Hematologic:  - neg hematologic  ROS     Musculoskeletal: Comment: Frequent aspirin use      GI/Hepatic: Comment: Elevated liver enzymes    (+) GERD,     Renal/Genitourinary:  - neg Renal ROS     Endo:     (+) Obesity,     Psychiatric/Substance  Use:     (+) psychiatric history anxiety     Infectious Disease:       Malignancy:  - neg malignancy ROS     Other:            Physical Exam    Airway  airway exam normal           Respiratory Devices and Support         Dental  no notable dental history         Cardiovascular   cardiovascular exam normal          Pulmonary   pulmonary exam normal                OUTSIDE LABS:  CBC:   Lab Results   Component Value Date    HGB 14.8 09/01/2020    HGB 14.3 10/19/2005     BMP:   Lab Results   Component Value Date     09/01/2020    POTASSIUM 4.3 09/01/2020    CHLORIDE 106 09/01/2020    CO2 26 09/01/2020    BUN 15 09/01/2020    CR 0.80 09/01/2020    GLC 97 09/01/2020     COAGS: No results found for: PTT, INR, FIBR  POC: No results found for: BGM, HCG, HCGS  HEPATIC: No results found for: ALBUMIN, PROTTOTAL, ALT, AST, GGT, ALKPHOS, BILITOTAL, BILIDIRECT, DANIEL  OTHER:   Lab Results   Component Value Date    COSMO 8.6 09/01/2020       Anesthesia Plan    ASA Status:  2      Anesthesia Type: Peripheral Nerve Block (GETA backup.).              Consents    Anesthesia Plan(s) and associated risks, benefits, and realistic alternatives discussed. Questions answered and patient/representative(s) expressed understanding.    - Discussed:     - Discussed with:  Patient         Postoperative Care    Pain management: IV analgesics, Multi-modal analgesia, Peripheral nerve block (Single Shot).   PONV prophylaxis: Ondansetron (or other 5HT-3), Dexamethasone or Solumedrol     Comments:                Noe Moffett MD

## 2021-12-24 NOTE — ADDENDUM NOTE
Addendum  created 12/24/21 1119 by Noe Moffett MD    Clinical Note Signed, Diagnosis association updated, Intraprocedure Blocks edited

## 2021-12-27 ENCOUNTER — DOCUMENTATION ONLY (OUTPATIENT)
Dept: OTHER | Facility: CLINIC | Age: 61
End: 2021-12-27
Payer: COMMERCIAL

## (undated) DEVICE — GLOVE PROTEXIS W/NEU-THERA 7.0  2D73TE70

## (undated) DEVICE — MIDAS REX DISSECTING TOOL  14MH30

## (undated) DEVICE — KIT SHOULDER POSITIONING BEACH CHAIR ARM HOLDER AR-1644

## (undated) DEVICE — Device

## (undated) DEVICE — BUR ARTHREX COOLCUT OVAL 5.5MMX13CM AR-8550FOE

## (undated) DEVICE — SU ETHILON 3-0 FS-1 18" 669H

## (undated) DEVICE — SYR 03ML LL W/O NDL 309657

## (undated) DEVICE — DRAPE STERI TOWEL SM 1000

## (undated) DEVICE — PIN DISTRACTION ANCHOR FOR SCR 14MM MDS9091414

## (undated) DEVICE — SOL WATER IRRIG 1000ML BOTTLE 2F7114

## (undated) DEVICE — MANIFOLD NEPTUNE 4 PORT 700-20

## (undated) DEVICE — ESU ELEC BLADE 2.75" COATED/INSULATED E1455

## (undated) DEVICE — NDL ARTHREX MULTIFIRE/FASTPASS SCORPION AR-13995N

## (undated) DEVICE — TAPE DRSG UNIVERSAL CLOTH 3" WHITE LATEX 881-3

## (undated) DEVICE — GLOVE PROTEXIS W/NEU-THERA 8.0  2D73TE80

## (undated) DEVICE — DRAPE STERI U 1015

## (undated) DEVICE — DRSG ABDOMINAL 07 1/2X8" 7197D

## (undated) DEVICE — IMM COLLAR CERVICAL MED UNIVERSAL 3X24" 79-83500

## (undated) DEVICE — SU NDL MAYO 1/2 216703

## (undated) DEVICE — PACK SHOULDER ARTHROSCOPY SOP15SAFSH

## (undated) DEVICE — DRAPE STERI TOWEL LG 1010

## (undated) DEVICE — NDL 19GA 1.5"

## (undated) DEVICE — GLOVE PROTEXIS MICRO 7.5  2D73PM75

## (undated) DEVICE — SOL NACL 0.9% IRRIG 1000ML BOTTLE 2F7124

## (undated) DEVICE — LINEN TOWEL PACK X5 5464

## (undated) DEVICE — DRAPE MAYO STAND 23X54 8337

## (undated) DEVICE — DRAPE MICROSOPE ZEISS 52X150" 6120

## (undated) DEVICE — GLOVE PROTEXIS BLUE W/NEU-THERA 7.0  2D73EB70

## (undated) DEVICE — SOL NACL 0.9% IRRIG 3000ML BAG 2B7477

## (undated) DEVICE — PREP DURAPREP 26ML APL 8630

## (undated) DEVICE — TUBING ARTHROSCOPY PUMP ARTHREX AR-6410

## (undated) DEVICE — SU VICRYL 3-0 TIE 12X18" J904T

## (undated) DEVICE — GLOVE PROTEXIS BLUE W/NEU-THERA 8.0  2D73EB80

## (undated) DEVICE — DRAPE IOBAN INCISE 23X17" 6650EZ

## (undated) DEVICE — ESU GROUND PAD UNIVERSAL W/O CORD

## (undated) DEVICE — CONNECTOR STOPCOCK 3 WAY MALE LL HI-FLO MX9311L

## (undated) DEVICE — PREP DURAPREP 06ML APL 8635

## (undated) DEVICE — GLOVE PROTEXIS BLUE W/NEU-THERA 7.5  2D73EB75

## (undated) DEVICE — SUCTION MANIFOLD NEPTUNE SGL

## (undated) DEVICE — SPONGE SURGIFOAM 12 1972

## (undated) DEVICE — ARTHROSCOPIC CANNULA 5.5X70MM GRAY  9718

## (undated) DEVICE — GLOVE PROTEXIS POWDER FREE 7.5 ORTHOPEDIC 2D73ET75

## (undated) DEVICE — SLING ARM LG 79-99157

## (undated) DEVICE — SU VICRYL 3-0 SH 27" J316H

## (undated) DEVICE — SU VICRYL 4-0 PS-2 18" UND J496H

## (undated) DEVICE — PACK SPINE SM CUSTOM SNE15SSFSK

## (undated) DEVICE — SU MONOCRYL 3-0 PS-2 27" Y427H

## (undated) DEVICE — BUR ARTHREX COOLCUT EXCALIBUR 4.0MMX13CM AR-8400EX

## (undated) RX ORDER — PROPOFOL 10 MG/ML
INJECTION, EMULSION INTRAVENOUS
Status: DISPENSED
Start: 2021-12-14

## (undated) RX ORDER — GLYCOPYRROLATE 0.2 MG/ML
INJECTION, SOLUTION INTRAMUSCULAR; INTRAVENOUS
Status: DISPENSED
Start: 2020-09-01

## (undated) RX ORDER — GLYCOPYRROLATE 0.2 MG/ML
INJECTION, SOLUTION INTRAMUSCULAR; INTRAVENOUS
Status: DISPENSED
Start: 2021-12-14

## (undated) RX ORDER — BUPIVACAINE HYDROCHLORIDE AND EPINEPHRINE 2.5; 5 MG/ML; UG/ML
INJECTION, SOLUTION EPIDURAL; INFILTRATION; INTRACAUDAL; PERINEURAL
Status: DISPENSED
Start: 2021-12-14

## (undated) RX ORDER — DEXAMETHASONE SODIUM PHOSPHATE 4 MG/ML
INJECTION, SOLUTION INTRA-ARTICULAR; INTRALESIONAL; INTRAMUSCULAR; INTRAVENOUS; SOFT TISSUE
Status: DISPENSED
Start: 2021-12-14

## (undated) RX ORDER — LIDOCAINE HYDROCHLORIDE 20 MG/ML
INJECTION, SOLUTION EPIDURAL; INFILTRATION; INTRACAUDAL; PERINEURAL
Status: DISPENSED
Start: 2021-12-14

## (undated) RX ORDER — FENTANYL CITRATE 50 UG/ML
INJECTION, SOLUTION INTRAMUSCULAR; INTRAVENOUS
Status: DISPENSED
Start: 2021-12-14

## (undated) RX ORDER — HYDROMORPHONE HYDROCHLORIDE 1 MG/ML
INJECTION, SOLUTION INTRAMUSCULAR; INTRAVENOUS; SUBCUTANEOUS
Status: DISPENSED
Start: 2020-09-01

## (undated) RX ORDER — CEFAZOLIN SODIUM 2 G/100ML
INJECTION, SOLUTION INTRAVENOUS
Status: DISPENSED
Start: 2020-09-01

## (undated) RX ORDER — FENTANYL CITRATE 0.05 MG/ML
INJECTION, SOLUTION INTRAMUSCULAR; INTRAVENOUS
Status: DISPENSED
Start: 2020-09-01

## (undated) RX ORDER — ONDANSETRON 2 MG/ML
INJECTION INTRAMUSCULAR; INTRAVENOUS
Status: DISPENSED
Start: 2021-12-14

## (undated) RX ORDER — FENTANYL CITRATE 50 UG/ML
INJECTION, SOLUTION INTRAMUSCULAR; INTRAVENOUS
Status: DISPENSED
Start: 2020-09-01

## (undated) RX ORDER — CELECOXIB 200 MG/1
CAPSULE ORAL
Status: DISPENSED
Start: 2021-12-14

## (undated) RX ORDER — CEFAZOLIN SODIUM 1 G/3ML
INJECTION, POWDER, FOR SOLUTION INTRAMUSCULAR; INTRAVENOUS
Status: DISPENSED
Start: 2020-09-01

## (undated) RX ORDER — KETAMINE HCL IN NACL, ISO-OSM 100MG/10ML
SYRINGE (ML) INJECTION
Status: DISPENSED
Start: 2020-09-01

## (undated) RX ORDER — CEFAZOLIN SODIUM IN 0.9 % NACL 3 G/100 ML
INTRAVENOUS SOLUTION, PIGGYBACK (ML) INTRAVENOUS
Status: DISPENSED
Start: 2021-12-14

## (undated) RX ORDER — FENTANYL CITRATE 0.05 MG/ML
INJECTION, SOLUTION INTRAMUSCULAR; INTRAVENOUS
Status: DISPENSED
Start: 2021-12-14

## (undated) RX ORDER — ONDANSETRON 2 MG/ML
INJECTION INTRAMUSCULAR; INTRAVENOUS
Status: DISPENSED
Start: 2020-09-01

## (undated) RX ORDER — NEOSTIGMINE METHYLSULFATE 1 MG/ML
VIAL (ML) INJECTION
Status: DISPENSED
Start: 2020-09-01

## (undated) RX ORDER — ACETAMINOPHEN 325 MG/1
TABLET ORAL
Status: DISPENSED
Start: 2021-12-14